# Patient Record
Sex: FEMALE | Race: BLACK OR AFRICAN AMERICAN | Employment: FULL TIME | URBAN - METROPOLITAN AREA
[De-identification: names, ages, dates, MRNs, and addresses within clinical notes are randomized per-mention and may not be internally consistent; named-entity substitution may affect disease eponyms.]

---

## 2017-09-20 ENCOUNTER — HOSPITAL ENCOUNTER (OUTPATIENT)
Dept: MAMMOGRAPHY | Age: 52
Discharge: HOME | End: 2017-09-20
Attending: ADVANCED PRACTICE MIDWIFE | Admitting: ADVANCED PRACTICE MIDWIFE

## 2017-09-20 DIAGNOSIS — Z12.31 ENCOUNTER FOR SCREENING MAMMOGRAM FOR BREAST CANCER: ICD-10-CM

## 2017-11-09 ENCOUNTER — OFFICE VISIT (OUTPATIENT)
Dept: INTERNAL MEDICINE CLINIC | Age: 52
End: 2017-11-09

## 2017-11-09 VITALS
OXYGEN SATURATION: 99 % | HEART RATE: 69 BPM | BODY MASS INDEX: 30.65 KG/M2 | SYSTOLIC BLOOD PRESSURE: 112 MMHG | WEIGHT: 173 LBS | DIASTOLIC BLOOD PRESSURE: 72 MMHG

## 2017-11-09 DIAGNOSIS — R51.9 NONINTRACTABLE HEADACHE, UNSPECIFIED CHRONICITY PATTERN, UNSPECIFIED HEADACHE TYPE: Primary | ICD-10-CM

## 2017-11-09 DIAGNOSIS — Z12.11 SCREEN FOR COLON CANCER: ICD-10-CM

## 2017-11-09 DIAGNOSIS — Z23 NEED FOR PROPHYLACTIC VACCINATION AND INOCULATION AGAINST INFLUENZA: ICD-10-CM

## 2017-11-09 PROCEDURE — 99213 OFFICE O/P EST LOW 20 MIN: CPT | Performed by: INTERNAL MEDICINE

## 2017-11-09 PROCEDURE — G8417 CALC BMI ABV UP PARAM F/U: HCPCS | Performed by: INTERNAL MEDICINE

## 2017-11-09 PROCEDURE — 1036F TOBACCO NON-USER: CPT | Performed by: INTERNAL MEDICINE

## 2017-11-09 PROCEDURE — G8484 FLU IMMUNIZE NO ADMIN: HCPCS | Performed by: INTERNAL MEDICINE

## 2017-11-09 PROCEDURE — G8427 DOCREV CUR MEDS BY ELIG CLIN: HCPCS | Performed by: INTERNAL MEDICINE

## 2017-11-09 PROCEDURE — 3014F SCREEN MAMMO DOC REV: CPT | Performed by: INTERNAL MEDICINE

## 2017-11-09 PROCEDURE — 90688 IIV4 VACCINE SPLT 0.5 ML IM: CPT | Performed by: INTERNAL MEDICINE

## 2017-11-09 PROCEDURE — 3017F COLORECTAL CA SCREEN DOC REV: CPT | Performed by: INTERNAL MEDICINE

## 2017-11-09 PROCEDURE — 90471 IMMUNIZATION ADMIN: CPT | Performed by: INTERNAL MEDICINE

## 2017-11-09 RX ORDER — DORZOLAMIDE HYDROCHLORIDE AND TIMOLOL MALEATE 20; 5 MG/ML; MG/ML
SOLUTION/ DROPS OPHTHALMIC
COMMUNITY
Start: 2017-11-07

## 2017-11-09 ASSESSMENT — ENCOUNTER SYMPTOMS
BACK PAIN: 0
CONSTIPATION: 0
WHEEZING: 0
VOMITING: 0
NAUSEA: 0
COUGH: 0
DIARRHEA: 0
CHEST TIGHTNESS: 0
ABDOMINAL DISTENTION: 0
SHORTNESS OF BREATH: 0

## 2017-11-09 ASSESSMENT — PATIENT HEALTH QUESTIONNAIRE - PHQ9
SUM OF ALL RESPONSES TO PHQ QUESTIONS 1-9: 0
SUM OF ALL RESPONSES TO PHQ9 QUESTIONS 1 & 2: 0
1. LITTLE INTEREST OR PLEASURE IN DOING THINGS: 0
2. FEELING DOWN, DEPRESSED OR HOPELESS: 0

## 2017-11-09 NOTE — PROGRESS NOTES
colon cancer  -     Amb External Referral To Gastroenterology    Need for prophylactic vaccination and inoculation against influenza  -     INFLUENZA, QUADV, 3 YRS AND OLDER, IM, MDV, 0.5ML (Ciro Munoz)          Plan:      See above plan

## 2018-11-21 ENCOUNTER — HOSPITAL ENCOUNTER (OUTPATIENT)
Dept: WOMENS IMAGING | Age: 53
Discharge: HOME | End: 2018-11-21
Payer: MEDICAID

## 2018-11-21 DIAGNOSIS — Z12.31 ENCOUNTER FOR SCREENING MAMMOGRAM FOR BREAST CANCER: ICD-10-CM

## 2018-11-21 PROCEDURE — 77063 BREAST TOMOSYNTHESIS BI: CPT

## 2019-04-15 ENCOUNTER — PATIENT MESSAGE (OUTPATIENT)
Dept: OTHER | Facility: CLINIC | Age: 54
End: 2019-04-15

## 2019-07-18 ENCOUNTER — OFFICE VISIT (OUTPATIENT)
Dept: INTERNAL MEDICINE CLINIC | Age: 54
End: 2019-07-18
Payer: MEDICAID

## 2019-07-18 VITALS
WEIGHT: 158.8 LBS | BODY MASS INDEX: 28.14 KG/M2 | SYSTOLIC BLOOD PRESSURE: 112 MMHG | OXYGEN SATURATION: 98 % | RESPIRATION RATE: 16 BRPM | HEART RATE: 75 BPM | HEIGHT: 63 IN | DIASTOLIC BLOOD PRESSURE: 74 MMHG

## 2019-07-18 DIAGNOSIS — R42 VERTIGO: Primary | ICD-10-CM

## 2019-07-18 PROCEDURE — G8427 DOCREV CUR MEDS BY ELIG CLIN: HCPCS | Performed by: INTERNAL MEDICINE

## 2019-07-18 PROCEDURE — 1036F TOBACCO NON-USER: CPT | Performed by: INTERNAL MEDICINE

## 2019-07-18 PROCEDURE — 3017F COLORECTAL CA SCREEN DOC REV: CPT | Performed by: INTERNAL MEDICINE

## 2019-07-18 PROCEDURE — 99213 OFFICE O/P EST LOW 20 MIN: CPT | Performed by: INTERNAL MEDICINE

## 2019-07-18 PROCEDURE — G8419 CALC BMI OUT NRM PARAM NOF/U: HCPCS | Performed by: INTERNAL MEDICINE

## 2019-07-18 ASSESSMENT — ENCOUNTER SYMPTOMS
NAUSEA: 0
CONSTIPATION: 0
DIARRHEA: 0
SINUS PAIN: 0
WHEEZING: 0
SINUS PRESSURE: 0
BACK PAIN: 0
RHINORRHEA: 0
COUGH: 0
ABDOMINAL DISTENTION: 0
CHEST TIGHTNESS: 0
SHORTNESS OF BREATH: 0
VOMITING: 0

## 2019-07-18 ASSESSMENT — PATIENT HEALTH QUESTIONNAIRE - PHQ9
SUM OF ALL RESPONSES TO PHQ9 QUESTIONS 1 & 2: 0
2. FEELING DOWN, DEPRESSED OR HOPELESS: 0
SUM OF ALL RESPONSES TO PHQ QUESTIONS 1-9: 0
1. LITTLE INTEREST OR PLEASURE IN DOING THINGS: 0
SUM OF ALL RESPONSES TO PHQ QUESTIONS 1-9: 0

## 2019-07-18 NOTE — PROGRESS NOTES
7/18/19    César Llanos  1965      Chief Complaint   Patient presents with    Dizziness       HPI     Here with 2 week h/o dizziness. Began after a respiratory infection which has resolved. States it feels like the room is spinning. Exacerbated with rotating her head or rolling over in bed. sxs last approx 30 seconds. Denies diplopia, dysarthria, dysphagia. Review of Systems   Constitutional: Negative for unexpected weight change. HENT: Negative for congestion, ear discharge, ear pain, postnasal drip, rhinorrhea, sinus pressure and sinus pain. Respiratory: Negative for cough, chest tightness, shortness of breath and wheezing. Cardiovascular: Negative for chest pain, palpitations and leg swelling. Gastrointestinal: Negative for abdominal distention, constipation, diarrhea, nausea and vomiting. Musculoskeletal: Negative for arthralgias, back pain and myalgias. Neurological: Positive for dizziness. Negative for tremors and numbness. All other systems reviewed and are negative. Current Outpatient Medications   Medication Sig Dispense Refill    dorzolamide-timolol (COSOPT) 22.3-6.8 MG/ML ophthalmic solution       latanoprost (XALATAN) 0.005 % ophthalmic solution       butalbital-acetaminophen-caffeine (FIORICET, ESGIC) -40 MG per tablet Take 1 tablet by mouth every 6 hours as needed for Headaches 20 tablet 0     No current facility-administered medications for this visit. Physical Exam   Constitutional: She is oriented to person, place, and time. She appears well-developed and well-nourished. No distress. HENT:   Right Ear: External ear normal.   Left Ear: External ear normal.   Mouth/Throat: Oropharynx is clear and moist. No oropharyngeal exudate. B tms wnl   Neck: Neck supple. No thyromegaly present. Cardiovascular: Normal rate, regular rhythm and normal heart sounds. Exam reveals no gallop and no friction rub. No murmur heard.   Pulmonary/Chest: Effort

## 2019-10-25 ENCOUNTER — E-VISIT (OUTPATIENT)
Dept: INTERNAL MEDICINE CLINIC | Age: 54
End: 2019-10-25
Payer: MEDICAID

## 2019-10-25 DIAGNOSIS — R09.81 SINUS CONGESTION: Primary | ICD-10-CM

## 2019-10-25 PROCEDURE — 98969 PR NONPHYSICIAN ONLINE ASSESSMENT AND MANAGEMENT: CPT | Performed by: NURSE PRACTITIONER

## 2019-10-25 ASSESSMENT — LIFESTYLE VARIABLES: SMOKING_STATUS: NO, I'VE NEVER SMOKED

## 2019-10-26 RX ORDER — CETIRIZINE HYDROCHLORIDE 10 MG/1
10 TABLET ORAL DAILY
Qty: 30 TABLET | Refills: 0 | Status: SHIPPED | OUTPATIENT
Start: 2019-10-26 | End: 2020-02-06

## 2019-10-26 RX ORDER — DOXYCYCLINE 100 MG/1
100 CAPSULE ORAL 2 TIMES DAILY
Qty: 20 CAPSULE | Refills: 0 | Status: SHIPPED | OUTPATIENT
Start: 2019-10-26

## 2020-02-06 RX ORDER — CETIRIZINE HYDROCHLORIDE 10 MG/1
TABLET ORAL
Qty: 30 TABLET | Refills: 0 | Status: SHIPPED | OUTPATIENT
Start: 2020-02-06

## 2020-12-31 ENCOUNTER — OFFICE VISIT (OUTPATIENT)
Dept: PRIMARY CARE CLINIC | Age: 55
End: 2020-12-31
Payer: MEDICAID

## 2020-12-31 PROCEDURE — G8421 BMI NOT CALCULATED: HCPCS | Performed by: NURSE PRACTITIONER

## 2020-12-31 PROCEDURE — 99211 OFF/OP EST MAY X REQ PHY/QHP: CPT | Performed by: NURSE PRACTITIONER

## 2020-12-31 PROCEDURE — G8428 CUR MEDS NOT DOCUMENT: HCPCS | Performed by: NURSE PRACTITIONER

## 2020-12-31 NOTE — PATIENT INSTRUCTIONS
Advance Care Planning  People with COVID-19 may have no symptoms, mild symptoms, such as fever, cough, and shortness of breath or they may have more severe illness, developing severe and fatal pneumonia. As a result, Advance Care Planning with attention to naming a health care decision maker (someone you trust to make healthcare decisions for you if you could not speak for yourself) and sharing other health care preferences is important BEFORE a possible health crisis. Please contact your Primary Care Provider to discuss Advance Care Planning. Preventing the Spread of Coronavirus Disease 2019 in Homes and Residential Communities  For the most recent information go to [email or web address]    Prevention steps for People with confirmed or suspected COVID-19 (including persons under investigation) who do not need to be hospitalized  and   People with confirmed COVID-19 who were hospitalized and determined to be medically stable to go home    Your healthcare provider and public health staff will evaluate whether you can be cared for at home. If it is determined that you do not need to be hospitalized and can be isolated at home, you will be monitored by staff from your local or state health department. You should follow the prevention steps below until a healthcare provider or local or state health department says you can return to your normal activities. Stay home except to get medical care  People who are mildly ill with COVID-19 are able to isolate at home during their illness. You should restrict activities outside your home, except for getting medical care. Do not go to work, school, or public areas. Avoid using public transportation, ride-sharing, or taxis.   Separate yourself from other people and animals in your home People: As much as possible, you should stay in a specific room and away from other people in your home. Also, you should use a separate bathroom, if available. Animals: You should restrict contact with pets and other animals while you are sick with COVID-19, just like you would around other people. Although there have not been reports of pets or other animals becoming sick with COVID-19, it is still recommended that people sick with COVID-19 limit contact with animals until more information is known about the virus. When possible, have another member of your household care for your animals while you are sick. If you are sick with COVID-19, avoid contact with your pet, including petting, snuggling, being kissed or licked, and sharing food. If you must care for your pet or be around animals while you are sick, wash your hands before and after you interact with pets and wear a facemask. Call ahead before visiting your doctor  If you have a medical appointment, call the healthcare provider and tell them that you have or may have COVID-19. This will help the healthcare providers office take steps to keep other people from getting infected or exposed. Wear a facemask  You should wear a facemask when you are around other people (e.g., sharing a room or vehicle) or pets and before you enter a healthcare providers office. If you are not able to wear a facemask (for example, because it causes trouble breathing), then people who live with you should not stay in the same room with you, or they should wear a facemask if they enter your room. Cover your coughs and sneezes  Cover your mouth and nose with a tissue when you cough or sneeze. Throw used tissues in a lined trash can. Immediately wash your hands with soap and water for at least 20 seconds or, if soap and water are not available, clean your hands with an alcohol-based hand  that contains at least 60% alcohol.   Clean your hands often Wash your hands often with soap and water for at least 20 seconds, especially after blowing your nose, coughing, or sneezing; going to the bathroom; and before eating or preparing food. If soap and water are not readily available, use an alcohol-based hand  with at least 60% alcohol, covering all surfaces of your hands and rubbing them together until they feel dry. Soap and water are the best option if hands are visibly dirty. Avoid touching your eyes, nose, and mouth with unwashed hands. Avoid sharing personal household items  You should not share dishes, drinking glasses, cups, eating utensils, towels, or bedding with other people or pets in your home. After using these items, they should be washed thoroughly with soap and water. Clean all high-touch surfaces everyday  High touch surfaces include counters, tabletops, doorknobs, bathroom fixtures, toilets, phones, keyboards, tablets, and bedside tables. Also, clean any surfaces that may have blood, stool, or body fluids on them. Use a household cleaning spray or wipe, according to the label instructions. Labels contain instructions for safe and effective use of the cleaning product including precautions you should take when applying the product, such as wearing gloves and making sure you have good ventilation during use of the product.   Monitor your symptoms

## 2020-12-31 NOTE — PROGRESS NOTES
Carmen Francine received a viral test for COVID-19. They were educated on isolation and quarantine as appropriate. For any symptoms, they were directed to seek care from their PCP, given contact information to establish with a doctor, directed to an urgent care or the emergency room.

## 2021-01-02 LAB — SARS-COV-2, NAA: NOT DETECTED

## 2021-08-19 ENCOUNTER — OFFICE VISIT (OUTPATIENT)
Dept: INTERNAL MEDICINE CLINIC | Age: 56
End: 2021-08-19
Payer: MEDICAID

## 2021-08-19 ENCOUNTER — HOSPITAL ENCOUNTER (OUTPATIENT)
Age: 56
Discharge: HOME | End: 2021-08-19
Payer: MEDICAID

## 2021-08-19 VITALS
WEIGHT: 175 LBS | HEART RATE: 62 BPM | HEIGHT: 64 IN | DIASTOLIC BLOOD PRESSURE: 72 MMHG | SYSTOLIC BLOOD PRESSURE: 112 MMHG | BODY MASS INDEX: 29.88 KG/M2 | OXYGEN SATURATION: 99 %

## 2021-08-19 DIAGNOSIS — Z00.00 WELL ADULT EXAM: ICD-10-CM

## 2021-08-19 DIAGNOSIS — G89.29 CHRONIC PAIN OF BOTH KNEES: ICD-10-CM

## 2021-08-19 DIAGNOSIS — Z00.00 WELL ADULT EXAM: Primary | ICD-10-CM

## 2021-08-19 DIAGNOSIS — M25.562 CHRONIC PAIN OF BOTH KNEES: ICD-10-CM

## 2021-08-19 DIAGNOSIS — M25.561 CHRONIC PAIN OF BOTH KNEES: ICD-10-CM

## 2021-08-19 DIAGNOSIS — Z13.1 SCREENING FOR DIABETES MELLITUS: ICD-10-CM

## 2021-08-19 DIAGNOSIS — Z12.31 ENCOUNTER FOR SCREENING MAMMOGRAM FOR MALIGNANT NEOPLASM OF BREAST: ICD-10-CM

## 2021-08-19 LAB
A/G RATIO: 1.5 (ref 1.1–2.2)
ALBUMIN SERPL-MCNC: 4.4 G/DL (ref 3.4–5)
ALP BLD-CCNC: 79 U/L (ref 40–129)
ALT SERPL-CCNC: 12 U/L (ref 10–40)
ANION GAP SERPL CALCULATED.3IONS-SCNC: 9 MMOL/L (ref 3–16)
AST SERPL-CCNC: 13 U/L (ref 15–37)
BILIRUB SERPL-MCNC: <0.2 MG/DL (ref 0–1)
BUN BLDV-MCNC: 10 MG/DL (ref 7–20)
CALCIUM SERPL-MCNC: 9.2 MG/DL (ref 8.3–10.6)
CHLORIDE BLD-SCNC: 108 MMOL/L (ref 99–110)
CHOLESTEROL, TOTAL: 215 MG/DL (ref 0–199)
CO2: 27 MMOL/L (ref 21–32)
CREAT SERPL-MCNC: 0.6 MG/DL (ref 0.6–1.1)
GFR AFRICAN AMERICAN: >60
GFR NON-AFRICAN AMERICAN: >60
GLOBULIN: 3 G/DL
GLUCOSE BLD-MCNC: 72 MG/DL (ref 70–99)
HDLC SERPL-MCNC: 63 MG/DL (ref 40–60)
LDL CHOLESTEROL CALCULATED: 132 MG/DL
POTASSIUM SERPL-SCNC: 5 MMOL/L (ref 3.5–5.1)
SODIUM BLD-SCNC: 144 MMOL/L (ref 136–145)
TOTAL PROTEIN: 7.4 G/DL (ref 6.4–8.2)
TRIGL SERPL-MCNC: 102 MG/DL (ref 0–150)
VLDLC SERPL CALC-MCNC: 20 MG/DL

## 2021-08-19 PROCEDURE — 36415 COLL VENOUS BLD VENIPUNCTURE: CPT

## 2021-08-19 PROCEDURE — 80053 COMPREHEN METABOLIC PANEL: CPT

## 2021-08-19 PROCEDURE — 83036 HEMOGLOBIN GLYCOSYLATED A1C: CPT

## 2021-08-19 PROCEDURE — 99396 PREV VISIT EST AGE 40-64: CPT | Performed by: INTERNAL MEDICINE

## 2021-08-19 PROCEDURE — 80061 LIPID PANEL: CPT

## 2021-08-19 RX ORDER — PILOCARPINE HYDROCHLORIDE 10 MG/ML
SOLUTION/ DROPS OPHTHALMIC
COMMUNITY
Start: 2021-08-10

## 2021-08-19 SDOH — ECONOMIC STABILITY: FOOD INSECURITY: WITHIN THE PAST 12 MONTHS, THE FOOD YOU BOUGHT JUST DIDN'T LAST AND YOU DIDN'T HAVE MONEY TO GET MORE.: NEVER TRUE

## 2021-08-19 SDOH — ECONOMIC STABILITY: FOOD INSECURITY: WITHIN THE PAST 12 MONTHS, YOU WORRIED THAT YOUR FOOD WOULD RUN OUT BEFORE YOU GOT MONEY TO BUY MORE.: NEVER TRUE

## 2021-08-19 ASSESSMENT — ENCOUNTER SYMPTOMS
NAUSEA: 0
DIARRHEA: 0
SHORTNESS OF BREATH: 0
CONSTIPATION: 0
ABDOMINAL DISTENTION: 0
WHEEZING: 0
COUGH: 0
BACK PAIN: 0
VOMITING: 0
CHEST TIGHTNESS: 0

## 2021-08-19 ASSESSMENT — PATIENT HEALTH QUESTIONNAIRE - PHQ9
SUM OF ALL RESPONSES TO PHQ QUESTIONS 1-9: 0
2. FEELING DOWN, DEPRESSED OR HOPELESS: 0
SUM OF ALL RESPONSES TO PHQ QUESTIONS 1-9: 0
SUM OF ALL RESPONSES TO PHQ QUESTIONS 1-9: 0
1. LITTLE INTEREST OR PLEASURE IN DOING THINGS: 0
SUM OF ALL RESPONSES TO PHQ9 QUESTIONS 1 & 2: 0

## 2021-08-19 ASSESSMENT — SOCIAL DETERMINANTS OF HEALTH (SDOH): HOW HARD IS IT FOR YOU TO PAY FOR THE VERY BASICS LIKE FOOD, HOUSING, MEDICAL CARE, AND HEATING?: NOT HARD AT ALL

## 2021-08-19 NOTE — PROGRESS NOTES
8/19/21    Roselyn Tamayo  1965      Chief Complaint   Patient presents with    Annual Exam    Other     Left knee issues         /PLAN:  1. Well adult exam  Encouraged exercise and healthy diet. F/u with ob/gyn and dentist regularly. Recommend eye exam.  Wears seat belt. Provided rx for fasting labs. Continue medications. - Lipid Panel; Future  - Comprehensive Metabolic Panel; Future    2. Screening for diabetes mellitus  - Hemoglobin A1C; Future    3. Chronic pain of both knees  - [address] [facility]    4. Encounter for screening mammogram for malignant neoplasm of breast  - [facility] DIGITAL SCREEN W OR WO CAD BILATERAL; Future      HPI  Past Medical History, Medications, Social History, Family History, Health Maintenance have been reviewed with Roselyn Tamayo. C/o several month h/o B knee pain and feels as if they will give out on her when walking down steps. Left> right. No injury, swelling, redness      Review of Systems   Constitutional: Negative for unexpected weight change. Respiratory: Negative for cough, chest tightness, shortness of breath and wheezing. Cardiovascular: Negative for chest pain, palpitations and leg swelling. Gastrointestinal: Negative for abdominal distention, constipation, diarrhea, nausea and vomiting. Musculoskeletal: Positive for arthralgias. Negative for back pain and myalgias. Neurological: Negative for tremors and numbness. All other systems reviewed and are negative.       Current Outpatient Medications   Medication Sig Dispense Refill    cetirizine (ZYRTEC) 10 MG tablet TAKE ONE TABLET BY MOUTH DAILY 30 tablet 0    doxycycline monohydrate (MONODOX) 100 MG capsule Take 1 capsule by mouth 2 times daily 20 capsule 0    dorzolamide-timolol (COSOPT) 22.3-6.8 MG/ML ophthalmic solution       latanoprost (XALATAN) 0.005 % ophthalmic solution       pilocarpine (PILOCAR) 1 % ophthalmic solution       butalbital-acetaminophen-caffeine (FIORICET, ESGIC) -40 MG per tablet Take 1 tablet by mouth every 6 hours as needed for Headaches 20 tablet 0     No current facility-administered medications for this visit. Physical Exam  Constitutional:       General: She is not in acute distress. Appearance: She is well-developed. She is not diaphoretic. HENT:      Right Ear: External ear normal.      Left Ear: External ear normal.   Neck:      Thyroid: No thyromegaly. Cardiovascular:      Rate and Rhythm: Normal rate and regular rhythm. Heart sounds: Normal heart sounds. No murmur heard. No friction rub. No gallop. Pulmonary:      Effort: Pulmonary effort is normal. No respiratory distress. Breath sounds: Normal breath sounds. No wheezing or rales. Abdominal:      General: There is no distension. Palpations: Abdomen is soft. Tenderness: There is no abdominal tenderness. There is no guarding or rebound. Musculoskeletal:         General: No swelling, tenderness, deformity or signs of injury. Normal range of motion. Cervical back: Neck supple. Right lower leg: No edema. Left lower leg: No edema. Neurological:      Mental Status: She is alert and oriented to person, place, and time. Psychiatric:         Mood and Affect: Mood normal.         Behavior: Behavior normal.         Thought Content:  Thought content normal.         Judgment: Judgment normal.         Vitals:    08/19/21 0847   BP: 112/72   Pulse: 62   SpO2: 99%

## 2021-08-20 LAB
ESTIMATED AVERAGE GLUCOSE: 114 MG/DL
HBA1C MFR BLD: 5.6 %

## 2021-10-27 ENCOUNTER — HOSPITAL ENCOUNTER (OUTPATIENT)
Dept: WOMENS IMAGING | Age: 56
Discharge: HOME | End: 2021-10-27
Payer: MEDICAID

## 2021-10-27 VITALS — HEIGHT: 64 IN | BODY MASS INDEX: 29.02 KG/M2 | WEIGHT: 170 LBS

## 2021-10-27 DIAGNOSIS — Z12.31 ENCOUNTER FOR SCREENING MAMMOGRAM FOR MALIGNANT NEOPLASM OF BREAST: ICD-10-CM

## 2021-10-27 PROCEDURE — 77067 SCR MAMMO BI INCL CAD: CPT

## 2021-10-27 PROCEDURE — 77063 BREAST TOMOSYNTHESIS BI: CPT

## 2022-10-11 ENCOUNTER — TELEPHONE (OUTPATIENT)
Dept: INTERNAL MEDICINE CLINIC | Age: 57
End: 2022-10-11

## 2022-10-11 NOTE — TELEPHONE ENCOUNTER
----- Message from Francis Hill sent at 10/11/2022 10:48 AM EDT -----  Subject: Appointment Request    Reason for Call: New Patient/New to Provider Appointment needed: New   Patient Request Appointment    QUESTIONS    Reason for appointment request? No appointments available during search     Additional Information for Provider? pt is willing to wait for open luis  ---------------------------------------------------------------------------  --------------  [address]  [phone]; OK to leave message on voicemail  ---------------------------------------------------------------------------  --------------  SCRIPT ANSWERS  COVID Screen: [facility]

## 2022-10-11 NOTE — TELEPHONE ENCOUNTER
Left voice mail for patient to call office to schedule a New to Provider appointment.   Previous patient of Dr. Jacob Grossman

## 2022-10-12 NOTE — TELEPHONE ENCOUNTER
I spoke with patient. She wants to think about it and do some research before choosing a provider to schedule with. She said she would call us back today.

## 2022-12-22 ENCOUNTER — HOSPITAL ENCOUNTER (OUTPATIENT)
Dept: WOMENS IMAGING | Age: 57
Discharge: HOME | End: 2022-12-22
Payer: MEDICAID

## 2022-12-22 DIAGNOSIS — Z12.31 ENCOUNTER FOR SCREENING MAMMOGRAM FOR BREAST CANCER: ICD-10-CM

## 2022-12-22 PROCEDURE — 77063 BREAST TOMOSYNTHESIS BI: CPT

## 2023-01-12 ENCOUNTER — OFFICE VISIT (OUTPATIENT)
Dept: FAMILY MEDICINE CLINIC | Age: 58
End: 2023-01-12

## 2023-01-12 VITALS
WEIGHT: 178.8 LBS | BODY MASS INDEX: 31.68 KG/M2 | SYSTOLIC BLOOD PRESSURE: 112 MMHG | HEIGHT: 63 IN | HEART RATE: 85 BPM | OXYGEN SATURATION: 98 % | DIASTOLIC BLOOD PRESSURE: 70 MMHG

## 2023-01-12 DIAGNOSIS — Z86.13 HISTORY OF MALARIA: ICD-10-CM

## 2023-01-12 DIAGNOSIS — Z76.89 ENCOUNTER TO ESTABLISH CARE: Primary | ICD-10-CM

## 2023-01-12 DIAGNOSIS — Z76.89 ENCOUNTER TO ESTABLISH CARE: ICD-10-CM

## 2023-01-12 DIAGNOSIS — H40.1130 PRIMARY OPEN ANGLE GLAUCOMA OF BOTH EYES, UNSPECIFIED GLAUCOMA STAGE: ICD-10-CM

## 2023-01-12 DIAGNOSIS — H81.10 BENIGN PAROXYSMAL POSITIONAL VERTIGO, UNSPECIFIED LATERALITY: ICD-10-CM

## 2023-01-12 LAB
A/G RATIO: 1.5 (ref 1.1–2.2)
ALBUMIN SERPL-MCNC: 4.5 G/DL (ref 3.4–5)
ALP BLD-CCNC: 79 U/L (ref 40–129)
ALT SERPL-CCNC: 10 U/L (ref 10–40)
ANION GAP SERPL CALCULATED.3IONS-SCNC: 11 MMOL/L (ref 3–16)
AST SERPL-CCNC: 13 U/L (ref 15–37)
BILIRUB SERPL-MCNC: <0.2 MG/DL (ref 0–1)
BUN BLDV-MCNC: 14 MG/DL (ref 7–20)
CALCIUM SERPL-MCNC: 10.1 MG/DL (ref 8.3–10.6)
CHLORIDE BLD-SCNC: 105 MMOL/L (ref 99–110)
CHOLESTEROL, FASTING: 265 MG/DL (ref 0–199)
CO2: 26 MMOL/L (ref 21–32)
CREAT SERPL-MCNC: 0.8 MG/DL (ref 0.6–1.1)
GFR SERPL CREATININE-BSD FRML MDRD: >60 ML/MIN/{1.73_M2}
GLUCOSE BLD-MCNC: 88 MG/DL (ref 70–99)
HCT VFR BLD CALC: 44.1 % (ref 36–48)
HDLC SERPL-MCNC: 64 MG/DL (ref 40–60)
HEMOGLOBIN: 14.1 G/DL (ref 12–16)
LDL CHOLESTEROL CALCULATED: 176 MG/DL
MCH RBC QN AUTO: 27.2 PG (ref 26–34)
MCHC RBC AUTO-ENTMCNC: 32.1 G/DL (ref 31–36)
MCV RBC AUTO: 85 FL (ref 80–100)
PDW BLD-RTO: 14.4 % (ref 12.4–15.4)
PLATELET # BLD: 217 K/UL (ref 135–450)
PMV BLD AUTO: 8.1 FL (ref 5–10.5)
POTASSIUM SERPL-SCNC: 4.4 MMOL/L (ref 3.5–5.1)
RBC # BLD: 5.19 M/UL (ref 4–5.2)
SODIUM BLD-SCNC: 142 MMOL/L (ref 136–145)
TOTAL PROTEIN: 7.6 G/DL (ref 6.4–8.2)
TRIGLYCERIDE, FASTING: 124 MG/DL (ref 0–150)
TSH REFLEX: 2.82 UIU/ML (ref 0.27–4.2)
VLDLC SERPL CALC-MCNC: 25 MG/DL
WBC # BLD: 3.8 K/UL (ref 4–11)

## 2023-01-12 RX ORDER — LATANOPROST 50 UG/ML
1 SOLUTION/ DROPS OPHTHALMIC
COMMUNITY
Start: 2021-07-02

## 2023-01-12 RX ORDER — PILOCARPINE HYDROCHLORIDE 10 MG/ML
1 SOLUTION/ DROPS OPHTHALMIC
COMMUNITY
Start: 2021-05-07

## 2023-01-12 RX ORDER — TIMOLOL MALEATE 5 MG/ML
1 SOLUTION/ DROPS OPHTHALMIC
COMMUNITY
Start: 2021-07-14 | End: 2023-01-12

## 2023-01-12 RX ORDER — MECLIZINE HCL 12.5 MG/1
12.5 TABLET ORAL 3 TIMES DAILY PRN
Qty: 15 TABLET | Refills: 0 | Status: SHIPPED | OUTPATIENT
Start: 2023-01-12 | End: 2023-01-22

## 2023-01-12 SDOH — ECONOMIC STABILITY: TRANSPORTATION INSECURITY
IN THE PAST 12 MONTHS, HAS THE LACK OF TRANSPORTATION KEPT YOU FROM MEDICAL APPOINTMENTS OR FROM GETTING MEDICATIONS?: NO

## 2023-01-12 SDOH — ECONOMIC STABILITY: TRANSPORTATION INSECURITY
IN THE PAST 12 MONTHS, HAS LACK OF TRANSPORTATION KEPT YOU FROM MEETINGS, WORK, OR FROM GETTING THINGS NEEDED FOR DAILY LIVING?: NO

## 2023-01-12 SDOH — ECONOMIC STABILITY: FOOD INSECURITY: WITHIN THE PAST 12 MONTHS, THE FOOD YOU BOUGHT JUST DIDN'T LAST AND YOU DIDN'T HAVE MONEY TO GET MORE.: NEVER TRUE

## 2023-01-12 SDOH — ECONOMIC STABILITY: INCOME INSECURITY: IN THE LAST 12 MONTHS, WAS THERE A TIME WHEN YOU WERE NOT ABLE TO PAY THE MORTGAGE OR RENT ON TIME?: NO

## 2023-01-12 SDOH — ECONOMIC STABILITY: HOUSING INSECURITY
IN THE LAST 12 MONTHS, WAS THERE A TIME WHEN YOU DID NOT HAVE A STEADY PLACE TO SLEEP OR SLEPT IN A SHELTER (INCLUDING NOW)?: NO

## 2023-01-12 SDOH — ECONOMIC STABILITY: HOUSING INSECURITY: IN THE LAST 12 MONTHS, HOW MANY PLACES HAVE YOU LIVED?: 1

## 2023-01-12 SDOH — ECONOMIC STABILITY: FOOD INSECURITY: WITHIN THE PAST 12 MONTHS, YOU WORRIED THAT YOUR FOOD WOULD RUN OUT BEFORE YOU GOT MONEY TO BUY MORE.: NEVER TRUE

## 2023-01-12 ASSESSMENT — SOCIAL DETERMINANTS OF HEALTH (SDOH): HOW HARD IS IT FOR YOU TO PAY FOR THE VERY BASICS LIKE FOOD, HOUSING, MEDICAL CARE, AND HEATING?: NOT HARD AT ALL

## 2023-01-12 ASSESSMENT — PATIENT HEALTH QUESTIONNAIRE - PHQ9
2. FEELING DOWN, DEPRESSED OR HOPELESS: 1
SUM OF ALL RESPONSES TO PHQ QUESTIONS 1-9: 1
1. LITTLE INTEREST OR PLEASURE IN DOING THINGS: 0
SUM OF ALL RESPONSES TO PHQ QUESTIONS 1-9: 1
SUM OF ALL RESPONSES TO PHQ9 QUESTIONS 1 & 2: 1

## 2023-01-12 NOTE — PROGRESS NOTES
Julian Puente  YOB: 1965    Date of Service:  1/12/2023    Chief Complaint:   Julian Puente is a 62 y.o. female who presents to [facility]     Last doctor moved    Allergies: Allergies   Allergen Reactions    Cat Hair Extract     Chloroquine Itching       Family Hx:  Family History   Problem Relation Age of Onset    Hypertension Mother     Hypertension Father        Surgical HX:  Past Surgical History:   Procedure Laterality Date    MOUTH SURGERY         Social Hx:  Alcohol-  occasionally  Tobacco- never  Recreational- never  Patient's last menstrual period was 07/17/2019. menstrual cycle: post menopausal  Sexual activity: has sex with a male, no hx STD  AbnormalSxs: no      Medications:  Current Outpatient Medications   Medication Sig Dispense Refill    pilocarpine (PILOCAR) 1 % ophthalmic solution Apply 1 drop to eye      latanoprost (XALATAN) 0.005 % ophthalmic solution Apply 1 drop to eye      meclizine (ANTIVERT) 12.5 MG tablet Take 1 tablet by mouth 3 times daily as needed for Dizziness 15 tablet 0    cetirizine (ZYRTEC) 10 MG tablet TAKE ONE TABLET BY MOUTH DAILY 30 tablet 0    dorzolamide-timolol (COSOPT) 22.3-6.8 MG/ML ophthalmic solution        No current facility-administered medications for this visit. PMHx:  Patient Active Problem List   Diagnosis    Primary open angle glaucoma    History of malaria           HPI:      From [place] - moved here in 2000      Dizziness  - feels like vertigo  - had it before 3 years - did some exercises that made it go away  -yesterday morning, when she got out of bed  - lasted for a couple seconds  - happens when she makes a sudden movement or when she puts her eye drops in.          Wt Readings from Last 3 Encounters:   01/12/23 178 lb 12.8 oz (81.1 kg)   10/27/21 170 lb (77.1 kg)   08/19/21 175 lb (79.4 kg)     BP Readings from Last 3 Encounters:   01/12/23 112/70   08/19/21 112/72   07/18/19 112/74       Health Maintenance   Topic Date Due HIV screen  Never done    Hepatitis C screen  Never done    Cervical cancer screen  Never done    Shingles vaccine (1 of 2) Never done    COVID-19 Vaccine (3 - Booster for Pfizer series) 07/17/2021    Flu vaccine (1) 08/01/2022    Depression Screen  08/19/2022    Breast cancer screen  12/22/2024    DTaP/Tdap/Td vaccine (2 - Td or Tdap) 05/11/2025    Lipids  08/19/2026    Colorectal Cancer Screen  08/26/2029    Hepatitis A vaccine  Aged Out    Hib vaccine  Aged Out    Meningococcal (ACWY) vaccine  Aged Out    Pneumococcal 0-64 years Vaccine  Aged [employer] History   Administered Date(s) Administered    COVID-19, PFIZER PURPLE top, DILUTE for use, (age 15 y+), 30mcg/0.3mL 05/01/2021, 05/22/2021    Influenza, AFLURIA (age 1 yrs+), FLUZONE, (age 10 mo+), MDV, 0.5mL 11/09/2017    Tdap (Boostrix, Adacel) 05/11/2015       No flowsheet data found. PHQ-9  1/12/2023   Little interest or pleasure in doing things 0   Feeling down, depressed, or hopeless 1   PHQ-2 Score 1   PHQ-9 Total Score 1        The 10-year ASCVD risk score (Tal WHIET, et al., 2019) is: 2.5%    Values used to calculate the score:      Age: 62 years      Sex: Female      Is Non- : Yes      Diabetic: No      Tobacco smoker: No      Systolic Blood Pressure: 754 mmHg      Is BP treated: No      HDL Cholesterol: 63 mg/dL      Total Cholesterol: 215 mg/dL        Review of Systems:  Review of Systems    Physical Exam:   Vitals:    01/12/23 0928   BP: 112/70   Site: Right Upper Arm   Position: Sitting   Cuff Size: Large Adult   Pulse: 85   SpO2: 98%   Weight: 178 lb 12.8 oz (81.1 kg)   Height: 5' 3\" (1.6 m)     Body mass index is 31.67 kg/m². Physical Exam  Constitutional:       Appearance: Normal appearance. HENT:      Head: Atraumatic.       Right Ear: External ear normal.      Left Ear: External ear normal.      Nose: Nose normal.      Mouth/Throat:      Mouth: Mucous membranes are moist.      Pharynx: Oropharynx is clear. No posterior oropharyngeal erythema. Eyes:      General: No scleral icterus. Extraocular Movements: Extraocular movements intact. Pupils: Pupils are equal, round, and reactive to light. Cardiovascular:      Rate and Rhythm: Normal rate and regular rhythm. Pulses: Normal pulses. Heart sounds: No murmur heard. Pulmonary:      Effort: Pulmonary effort is normal.      Breath sounds: Normal breath sounds. Abdominal:      General: Abdomen is flat. Musculoskeletal:         General: Normal range of motion. Cervical back: Normal range of motion and neck supple. Skin:     General: Skin is warm and dry. Capillary Refill: Capillary refill takes 2 to 3 seconds. Neurological:      General: No focal deficit present. Mental Status: She is alert. Comments: Positive [place] on left   Psychiatric:         Mood and Affect: Mood normal.       Assessment/Plan:    Patient presents today for new patient visit. Her main concern today is vertigo and will happen for a few seconds at a time since yesterday morning. Has a history of BPPV, based on physical exam today is consistent with what she has. Did send in some Antivert for her. Offered to send her to physical therapy for Epley maneuver, patient however preferred to watch a YouTube video at her home and do it herself. If no improvement I will send in that referral.    For new patient visit today we did get baseline labs. We will have her return in 6 months for her annual physical.    1. Encounter to establish care  -     TSH with Reflex; Future  -     Hemoglobin A1C; Future  -     Comprehensive Metabolic Panel; Future  -     Lipid, Fasting; Future  -     CBC; Future  2. History of malaria  3. Primary open angle glaucoma of both eyes, unspecified glaucoma stage  4. Benign paroxysmal positional vertigo, unspecified laterality  -     meclizine (ANTIVERT) 12.5 MG tablet;  Take 1 tablet by mouth 3 times daily as needed for Dizziness, Disp-15 tablet, R-0Normal     While assessing care for this patient, I have reviewed all pertinent lab work/imaging/ specialist notes and care in reference to those problems addressed above in detail. Appropriate medical decision making was based on this.     Please note that portions of this note may have been completed with a voice recognition program. Efforts were made to edit the dictations but occasionally words are mis-transcribed.      Return in about 6 months (around 7/12/2023) for Physical.    Leighann Jesus MD  1/12/2023

## 2023-01-13 LAB
ESTIMATED AVERAGE GLUCOSE: 114 MG/DL
HBA1C MFR BLD: 5.6 %

## 2023-02-01 ENCOUNTER — OFFICE VISIT (OUTPATIENT)
Dept: FAMILY MEDICINE CLINIC | Age: 58
End: 2023-02-01
Payer: MEDICAID

## 2023-02-01 VITALS
HEIGHT: 63 IN | OXYGEN SATURATION: 99 % | RESPIRATION RATE: 16 BRPM | DIASTOLIC BLOOD PRESSURE: 84 MMHG | WEIGHT: 183 LBS | BODY MASS INDEX: 32.43 KG/M2 | HEART RATE: 71 BPM | SYSTOLIC BLOOD PRESSURE: 116 MMHG

## 2023-02-01 DIAGNOSIS — M79.601 RIGHT ARM PAIN: ICD-10-CM

## 2023-02-01 DIAGNOSIS — H40.1130 PRIMARY OPEN ANGLE GLAUCOMA OF BOTH EYES, UNSPECIFIED GLAUCOMA STAGE: ICD-10-CM

## 2023-02-01 DIAGNOSIS — Z76.89 ENCOUNTER TO ESTABLISH CARE: ICD-10-CM

## 2023-02-01 DIAGNOSIS — H81.10 BENIGN PAROXYSMAL POSITIONAL VERTIGO, UNSPECIFIED LATERALITY: Primary | ICD-10-CM

## 2023-02-01 DIAGNOSIS — E78.00 HYPERCHOLESTEREMIA: ICD-10-CM

## 2023-02-01 PROCEDURE — G8427 DOCREV CUR MEDS BY ELIG CLIN: HCPCS | Performed by: REGISTERED NURSE

## 2023-02-01 PROCEDURE — 1036F TOBACCO NON-USER: CPT | Performed by: REGISTERED NURSE

## 2023-02-01 PROCEDURE — 99214 OFFICE O/P EST MOD 30 MIN: CPT | Performed by: REGISTERED NURSE

## 2023-02-01 PROCEDURE — G8484 FLU IMMUNIZE NO ADMIN: HCPCS | Performed by: REGISTERED NURSE

## 2023-02-01 PROCEDURE — 3017F COLORECTAL CA SCREEN DOC REV: CPT | Performed by: REGISTERED NURSE

## 2023-02-01 PROCEDURE — G8417 CALC BMI ABV UP PARAM F/U: HCPCS | Performed by: REGISTERED NURSE

## 2023-02-01 ASSESSMENT — ENCOUNTER SYMPTOMS
EYES NEGATIVE: 1
GASTROINTESTINAL NEGATIVE: 1
SHORTNESS OF BREATH: 0

## 2023-02-01 NOTE — PROGRESS NOTES
Patient: Елена Mccoy is a 62 y.o. female who presents today with the following Chief Complaint(s):  Chief Complaint   Patient presents with    New Patient    Arm Pain     Achey pain, x1 1/2 weeks       Assessment/Plan:    1. Benign paroxysmal positional vertigo, unspecified laterality  She has continued vertigo. She was seen for this previously and prescribed meclizine. She has a history of environmental/seasonal allergies and takes Zyrtec. She did not want to take this medication. Plan is Flonase OTC daily and referral to PT for vestibular therapy. - [facility]    2. Right arm pain  Pain and right upper arm appears to be musculoskeletal in origin. No abnormalities, masses or deformities identified on clinical exam.  Suspect a strain of her bicep. Plan is Tylenol or ibuprofen as needed and gentle stretches. 3. Hypercholesteremia  Reviewed labs completed on 1/12/2023. Cholesterol is elevated. Discussed treatment recommendations and ASCVD risk score. She is working on weight loss. Encouraged her to continue her nutrition goals and to increase her exercise. She can try red yeast rice. We also discussed benefits of a statin. Plan is for her to implement nutrition and exercise changes and follow-up in 6 months for recheck. 4. BMI 32.0-32.9,adult  Continue nutrition goals-recommend using a tracking luis. Decrease carbohydrates and sugars. She has been walking regularly but says that she walks at a very slow pace. Encouraged her to increase her walking pace and to add in body weight movements or light weights. Referred her to dietitian as well. 5. Primary open angle glaucoma of both eyes, unspecified glaucoma stage  Follow-up with [facility] as planned. 6. Encounter to establish care  Patient's past medical history, surgical history, family history, medications,  and allergies were all reviewed and updated as appropriate today.       Return in about 6 months (around 8/1/2023), or if symptoms worsen or fail to improve, for Cholesterol. HPI:     She is a 59-year-old female who is here to establish care. She has a history of open angle glaucoma, history of malaria, vertigo and seasonal/environmental allergies. Environmental allergies-managed well with Zyrtec. She says her main symptom is her skin feels itchy. When she takes Zyrtec this resolves. She denies any rash. She has vertigo-she was prescribed meclizine but did not want to take this medication. She has not been able to resolve her vertigo with Epley maneuvers on her own. Right arm pain for 1 week- hurts to touch. No pain with movement. She is working on weight loss. She has been using NOOM and walks daily. Arm Pain   The incident occurred 5 to 7 days ago. There was no injury mechanism. The pain is present in the upper right arm. The quality of the pain is described as aching. The pain does not radiate. The pain is mild. The pain has been Intermittent since the incident. Pertinent negatives include no chest pain, muscle weakness, numbness or tingling. The symptoms are aggravated by palpation. She has tried nothing for the symptoms. The 10-year ASCVD risk score (Tal DK, et al., 2019) is: 3.3%    Values used to calculate the score:      Age: 62 years      Sex: Female      Is Non- : Yes      Diabetic: No      Tobacco smoker: No      Systolic Blood Pressure: 849 mmHg      Is BP treated: No      HDL Cholesterol: 64 mg/dL      Total Cholesterol: 265 mg/dL      Current Outpatient Medications   Medication Sig Dispense Refill    TIMOLOL MALEATE OP Apply to eye      pilocarpine (PILOCAR) 1 % ophthalmic solution Apply 1 drop to eye      latanoprost (XALATAN) 0.005 % ophthalmic solution Apply 1 drop to eye      cetirizine (ZYRTEC) 10 MG tablet TAKE ONE TABLET BY MOUTH DAILY 30 tablet 0     No current facility-administered medications for this visit.        Review of Systems   Constitutional:  Negative for appetite change, chills, diaphoresis, fatigue and fever.   HENT: Negative.     Eyes: Negative.    Respiratory:  Negative for shortness of breath.    Cardiovascular:  Negative for chest pain.   Gastrointestinal: Negative.    Genitourinary:  Negative for difficulty urinating.   Musculoskeletal:  Positive for myalgias (right upper arm).   Skin: Negative.    Neurological:  Positive for dizziness (vertigo). Negative for tingling and numbness.   Hematological: Negative.    Psychiatric/Behavioral:  Negative for dysphoric mood and sleep disturbance. The patient is not nervous/anxious.      Vitals:    02/01/23 0917   BP: 116/84   Site: Left Upper Arm   Position: Sitting   Cuff Size: Large Adult   Pulse: 71   Resp: 16   SpO2: 99%   Weight: 183 lb (83 kg)   Height: 5' 3\" (1.6 m)     Physical Exam  Vitals reviewed.   Constitutional:       General: She is not in acute distress.     Appearance: Normal appearance. She is not ill-appearing or toxic-appearing.   HENT:      Head: Normocephalic and atraumatic.      Right Ear: Tympanic membrane, ear canal and external ear normal. There is no impacted cerumen.      Left Ear: Tympanic membrane, ear canal and external ear normal. There is no impacted cerumen.      Nose: Nose normal. No congestion or rhinorrhea.      Mouth/Throat:      Mouth: Mucous membranes are moist.      Pharynx: Oropharynx is clear. No oropharyngeal exudate or posterior oropharyngeal erythema.   Eyes:      General:         Right eye: No discharge.         Left eye: No discharge.      Extraocular Movements: Extraocular movements intact.      Conjunctiva/sclera: Conjunctivae normal.      Pupils: Pupils are equal, round, and reactive to light.   Cardiovascular:      Rate and Rhythm: Normal rate and regular rhythm.      Pulses: Normal pulses.      Heart sounds: Normal heart sounds. No murmur heard.    No friction rub. No gallop.   Pulmonary:      Effort: Pulmonary effort is  normal.      Breath sounds: Normal breath sounds. No stridor. No wheezing, rhonchi or rales. Musculoskeletal:         General: Tenderness present. No swelling, deformity or signs of injury. Normal range of motion. Right upper arm: Tenderness present. No swelling, edema, deformity, lacerations or bony tenderness. Arms:       Cervical back: Normal range of motion and neck supple. No tenderness. Right lower leg: No edema. Left lower leg: No edema. Lymphadenopathy:      Cervical: No cervical adenopathy. Skin:     General: Skin is warm and dry. Capillary Refill: Capillary refill takes less than 2 seconds. Coloration: Skin is not jaundiced or pale. Findings: No erythema or rash. Neurological:      General: No focal deficit present. Mental Status: She is alert and oriented to person, place, and time. Psychiatric:         Mood and Affect: Mood normal.         Behavior: Behavior normal.         Thought Content: Thought content normal.         Judgment: Judgment normal.       Patient Active Problem List   Diagnosis    Primary open angle glaucoma    History of malaria     Past Medical History:   Diagnosis Date    Environmental and seasonal allergies     Glaucoma     Vertigo       Past Surgical History:   Procedure Laterality Date    MOUTH SURGERY         Family History   Problem Relation Age of Onset    Hypertension Mother     Hypertension Father       Allergies   Allergen Reactions    Cat Hair Extract     Chloroquine Itching       This chart was generated using the [address] [place] [address] [address] Pager[employer]ation system. I created this record but it may contain dictation errors due to the limitation of the software.

## 2023-02-01 NOTE — Clinical Note
This is a patient who would benefit from connecting with the dietician. She has a history of elevated cholesterol.    Thank you,   Demetrius RENDON, CNP

## 2023-02-02 ENCOUNTER — CARE COORDINATION (OUTPATIENT)
Dept: CARE COORDINATION | Age: 58
End: 2023-02-02

## 2023-02-02 NOTE — CARE COORDINATION
cc outreach 1 pcp referral. Attempted to reach patient by phone. No answer. Left brief message with name, contact number and asked for return call back. Waiting for patient response at this time. Will update notes when callback is received. Will follow up at another date and time.

## 2023-02-03 ENCOUNTER — CARE COORDINATION (OUTPATIENT)
Dept: CARE COORDINATION | Age: 58
End: 2023-02-03

## 2023-02-03 NOTE — CARE COORDINATION
cc outreach 2 pcp referral. Attempted to reach patient by phone. No answer. Left brief message with name, contact number and asked for return call back. Waiting for patient response at this time. Will update notes when callback is received. Will follow up at another date and time.

## 2023-02-03 NOTE — CARE COORDINATION
Ambulatory Care Coordination Note  2/3/2023    ACC: Lori Brannon, NAMAN    Spoke to patient for initial cc screening from pcp referral. Patient reports no falls or injuries. Grab bars and tub mat not present in home. Well lit, reduced clutter. Patient does not use assistive devices for gait. Patient confirms she wants to speak to a dietician  for diet modifications to lower cholesterol and loose weight. No financial issues reported at this time. No additional questions, concerns. Starts PT 2/8/23 for initial evaluation. Plan:  POC to pcp/ Zoë ARANA primary ACM  FU RD initiated plan- how is it going  Sdoh, education, care gaps    Offered patient enrollment in the Remote Patient Monitoring (RPM) program for in-home monitoring: Patient is not eligible for RPM program.    Ambulatory Care Coordination Assessment    Care Coordination Protocol  Referral from Primary Care Provider: Yes  Week 1 - Initial Assessment     Do you have all of your prescriptions and are they filled?: Yes  Barriers to medication adherence: None  Are you able to afford your medications?: Yes  How often do you have trouble taking your medications the way you have been told to take them?: I always take them as prescribed. Do you have Home O2 Therapy?: No      Ability to seek help/take action for Emergent Urgent situations i.e. fire, crime, inclement weather or health crisis. : Independent  Ability to ambulate to restroom: Independent  Ability handle personal hygeine needs (bathing/dressing/grooming): Independent  Ability to manage Medications: Independent  Ability to prepare Food Preparation: Independent  Ability to maintain home (clean home, laundry): Independent  Ability to drive and/or has transportation: Independent  Ability to do shopping: Independent  Ability to manage finances:  Independent  Is patient able to live independently?: Yes     Current Housing: Private Residence        Per the [facility], did the patient have 2 or more falls or 1 fall with injury in the past year?: No     Frequent urination at night?: No  Do you use rails/bars?: No  Do you have a non-slip tub mat?: No     Are you experiencing loss of meaning?: No  Are you experiencing loss of hope and peace?: No     Thinking about your patient's physical health needs, are there any symptoms or problems (risk indicators) you are unsure about that require further investigation?: No identified areas of uncertainly or problems already being investigated   Are the patients physical health problems impacting on their mental well-being?: No identified areas of concern   Are there any problems with your patients lifestyle behaviors (alcohol, drugs, diet, exercise) that are impacting on physical or mental well-being?: No identified areas of concern   Do you have any other concerns about your patients mental well-being? How would you rate their severity and impact on the patient?: No identified areas of concern   How would you rate their home environment in terms of safety and stability (including domestic violence, insecure housing, neighbor harassment)?: Safety/stability questionable   How do daily activities impact on the patient's well-being? (include current or anticipated unemployment, work, caregiving, access to transportation or other): No identified problems or perceived positive benefits   How would you rate their social network (family, work, friends)?: Good participation with social networks   How would you rate their financial resources (including ability to afford all required medical care)?: Financially secure, resources adequate, no identified problems   How wells does the patient now understand their health and well-being (symptoms, signs or risk factors) and what they need to do to manage their health?: Little understanding which impacts on their ability to undertake better management   How well do you think your patient can engage in healthcare discussions?  (Barriers include language, deafness, aphasia, alcohol or drug problems, learning difficulties, concentration): Clear and open communication, no identified barriers   Do other services need to be involved to help this patient?: Other care/services not in place and required   Are current services involved with this patient well-coordinated? (Include coordination with other services you are now recommendation): Required care/services missing and/or fragmented   Suggested Interventions and Community Resources   Registered Dietician: In Process         Set up/Review Goals              Prior to Admission medications    Medication Sig Start Date End Date Taking?  Authorizing Provider   TIMOLOL MALEATE OP Apply to eye    Historical Provider, MD   pilocarpine (PILOCAR) 1 % ophthalmic solution Apply 1 drop to eye 5/7/21   Historical Provider, MD   latanoprost (XALATAN) 0.005 % ophthalmic solution Apply 1 drop to eye 7/2/21   Historical Provider, MD   cetirizine (ZYRTEC) 10 MG tablet TAKE ONE TABLET BY MOUTH DAILY 2/6/20   Faye Blue MD       Future Appointments   Date Time Provider Christofer Walker   2/8/2023  9:30 AM Seema Parks PT [facility] AND PT Brendan Gaviria   7/21/2023 10:00 AM MD [facility]  [place] - D[facility]      and   General Assessment    Do you have any symptoms that are causing concern?: No

## 2023-02-08 ENCOUNTER — HOSPITAL ENCOUNTER (OUTPATIENT)
Dept: PHYSICAL THERAPY | Age: 58
Setting detail: THERAPIES SERIES
Discharge: HOME | End: 2023-02-08

## 2023-02-08 ENCOUNTER — CARE COORDINATION (OUTPATIENT)
Dept: CARE COORDINATION | Age: 58
End: 2023-02-08

## 2023-02-08 NOTE — FLOWSHEET NOTE
[facility],  [address]        Physical Therapy  Cancellation/No-show Note  Patient Name:  Bryan Crane  :  1965   Date:  2023  Cancelled visits to date: 1  No-shows to date: 0    For today's appointment patient:  [x]  Cancelled  []  Rescheduled appointment  []  No-show     Reason given by patient:  []  Patient ill  []  Conflicting appointment  []  No transportation    []  Conflict with work  []  No reason given  [x]  Other:  pt reports she is feeling better    Comments:      Electronically signed by:  Priscilla Costa PT

## 2023-02-16 ENCOUNTER — CARE COORDINATION (OUTPATIENT)
Dept: CARE COORDINATION | Age: 58
End: 2023-02-16

## 2023-02-16 NOTE — CARE COORDINATION
Contacted [facility] and left voicemail regarding Dietitian referral. Left call back number and will follow up as appropriate.        Tylor Dean, [address] [place] [address], [place]  [phone]

## 2023-02-21 ENCOUNTER — CARE COORDINATION (OUTPATIENT)
Dept: CARE COORDINATION | Age: 58
End: 2023-02-21

## 2023-02-24 ENCOUNTER — CARE COORDINATION (OUTPATIENT)
Dept: CARE COORDINATION | Age: 58
End: 2023-02-24

## 2023-02-24 NOTE — CARE COORDINATION
Bella Shah  February 24, 2023    Initial Referral Reason: Desired Weight Loss, HLD    Patient Care Team:  JUSTICE Buck CNP as PCP - General (Family Medicine)  [facility],  as PCP - OBGYN (Obstetrics & Gynecology)  JUSTICE Buck CNP as PCP - Empaneled Provider  Agustín Medrano RN as Ambulatory Care Manager  Jatinder Silverio RD as Dietitian (Dietitian Registered)    Past Medical History:    Current Outpatient Medications   Medication Sig Dispense Refill    TIMOLOL MALEATE OP Apply to eye      pilocarpine (PILOCAR) 1 % ophthalmic solution Apply 1 drop to eye      latanoprost (XALATAN) 0.005 % ophthalmic solution Apply 1 drop to eye      cetirizine (ZYRTEC) 10 MG tablet TAKE ONE TABLET BY MOUTH DAILY 30 tablet 0     No current facility-administered medications for this visit.        Biochemical Data, Medical Tests and Procedures:    Lab Results   Component Value Date    LABA1C 5.6 01/12/2023     Lab Results   Component Value Date    .0 01/12/2023       Lab Results   Component Value Date    CHOL 215 (H) 08/19/2021    CHOL 202 (H) 05/12/2015     Lab Results   Component Value Date    TRIG 102 08/19/2021    TRIG 92 05/12/2015     Lab Results   Component Value Date    HDL 64 (H) 01/12/2023    HDL 63 (H) 08/19/2021    HDL 60 05/12/2015     Lab Results   Component Value Date    LDLCALC 176 (H) 01/12/2023    LDLCALC 132 (H) 08/19/2021    LDLCALC 124 (H) 05/12/2015     Lab Results   Component Value Date    LABVLDL 25 01/12/2023    LABVLDL 20 08/19/2021    LABVLDL 18 05/12/2015     No results found for: [facility]    Lab Results   Component Value Date    WBC 3.8 (L) 01/12/2023    HGB 14.1 01/12/2023    HCT 44.1 01/12/2023    MCV 85.0 01/12/2023     01/12/2023       Lab Results   Component Value Date    CREATININE 0.8 01/12/2023    BUN 14 01/12/2023     01/12/2023    K 4.4 01/12/2023     01/12/2023    CO2 26 01/12/2023         Anthropometric Measurements:    Height: 63\"  Weight: 183 lb (83 kg) (2/1/23)  BMI: 32.42 kg/m2 (obese, class I)  IBW: 115 lb +-10%  %IBW: 159.1 %    Physical Exam Findings:  Deferred    Nutrition Interview: RD called patient, explained reason for call and role in care. Patient states her main nutrition concern is desired weight loss and how to lower her cholesterol. Patient reports a current weight of 183 lbs, and has a goal weight of 150 lbs. Note patient's most recent cholesterol level was 265 mg/dL on 1/12/23. Patient reports that she eats three meals per day, and very seldom dines out. Patient is of [place] descent and prepares many traditional [place] foods. Per patient, starch is typically the \"main dish\" and meat is typically a side. Patient reports that she snacks \"all of the time. \" Patient drinks mostly calorie-free and sugar-free beverages. Patient's physical activity consists of walking four to five times per week for 30-60 minutes at a time. HLD: RD explained that a heart-healthy diet is recommended to reduce unhealthy blood cholesterol levels, manage high blood pressure, and lower overall risk for heart disease. Explained the importance of eating a balanced diet that includes whole grains, fruits and vegetables, and lean protein sources. Discussed choosing heart-healthy unsaturated fats and limiting saturated fats, trans fats, and cholesterol intake. Explained that saturated fat is usually found in animal-based protein (i.e., butter, whole milk, brasher, sausage, poultry skin) and is the biggest contributor to elevated LDL cholesterol levels in the diet. Explained that trans fats can be found in stick margarine, shortening, processed sweets, baked goods, some fried foods, and packaged foods made with hydrogenated oils. Encouraged patient to limit the amount of cholesterol intake to 200 mg per day. Explained that foods high in cholesterol include fatty meat, whole milk, cheese, shrimp, lobster, and crab.  Encouraged patient to eat more omega-3 fats (heart-healthy fats) such as salmon, tuna, walnuts, flaxseed, and canola/soybean oils. Encouraged patient to consume more plant-based or vegetarian meals using beans and soy foods for protein. Discussed the benefits of eating whole, unprocessed foods to limit sodium intake. Encouraged patient to limit refined carbohydrates especially sugar, sweets and sugar-sweetened beverages. Explained that patient should also try to achieve and maintain a healthy weight help lower risk for heart disease. Desired Weight Loss: RD explained the importance of setting healthy, realistic goals. Discussed you are more likely to succeed when you set realistic goals for yourself- make small changes step by step and you will see a big impact with time. Explained the importance of eating at least 3 meals/day and making these meals balanced with a variety of food. Discussed the components of a balanced meal using the MyPlate LLFDLDBHG-9/7 plate fruits and/or vegetables, 1/4 plate protein and 1/4 plate starchy carbohydrates with 8 oz glass of low fat milk if desired. Provided an example of a balanced meal: grilled chicken with broccoli and a serving of brown rice. Discussed watching portion sizes to help manage calorie intake. RD encouraged patient to focus on eating 3 balanced meals a day instead of 2 meals/day. Explained the importance of meeting estimated nutrition needs. Explained if we are not eating enough throughout the day, our body will go into starvation mode and hold onto fat cells. RD reiterated importance of choosing fruits, vegetables, whole grains, lean protein sources and low fat dairy products. RD discussed the importance of incorporating physical activity. Explained the recommended amount is 150 minutes/week. RD acknowledged patient's readiness to change and encouraged pt to focus on making small changes one at a time.      RD offered to mail educational handouts to patient to reinforce concepts discussed during phone conversation, patient was agreeable. RD verified patient's home address. 24-Hour Diet Recall  Breakfast  Consumed: Oatmeal with yogurt or milk OR peanut butter/jelly sandwich OR Eran Eran (boiled bean pudding)    Lunch  Consumed: [place] dish made with vegetables (cabbage, spinach, broccoli, green beans, etc) and grits     Dinner  Consumed: Rice and vegetables     Snacks  Consumed: Bananas, baby carrots, mixed nuts     Beverages: Water, black tea (occasionally prepared with sugar or cream), black coffee    Frequency of meals away from home: Very seldom, approximately once every two months or so    Exercise: Walk 4-5x/week for 30-60 minutes at a time     Blood sugar checks: N/A    Nutrition Diagnosis:  #1 Problem Overweight/Obesity       Etiology related to large portions, excess caloric intake       Signs/Symptoms as evidenced by patient report, food recall, BMI 32.42 kg/m2     #2 Problem Hypercholesterolemia        Etiology related to poor food choices       Signs/Symptoms as evidenced by patient report, cholesterol 265 mg/dL     Nutrition Intervention:     Estimated Needs  Heart Healthy Diet  providing 1500 kcals to promote 1 lb weight loss/week ([place] based on CBW).     Estimated daily CHO Needs: 160-235 g (based on 45-65% total calorie intake)  Estimated daily Protein Needs: 70-85 g (based on 0.8-1.0 g/kg based on CBW)  Estimated daily Fluid Needs: 1700 mL fluid/d (based on 20 mL/kg based on CBW)    #1 Nutrition Information: Provided patient with Adding Fiber to Lower Cholesterol, How Can I Improve Cholesterol, Cholesterol Lowering Nutrition Therapy, 1500 Calorie Meal Plan, All Foods Can Fit, Balanced Plate, Changing My Eating Habits, Eat Frequently to Lose Weight, Grocery Shopping Tips, Handling Weight Plateaus, Healthy Snacking Tips, Label Reading, Making Healthy Choices with a Healthy Plate, Portion Control, Smart Snacking handouts for reinforcement of concepts discussed during nutrition interview. #2 Nutrition Counseling: Used open-ended questions to assess patients perceived susceptibility and severity of disease state. Discussed potential impact of health threat on patient's lifestyle. Used open-ended questions to assess patient's perception regarding benefits of and barriers to implementation of nutrition therapy. #3 Nutrition Education: Clearly defined the benefits of nutrition therapy. Summarized and affirmed positive aspects of current nutrition patterns. Provided education regarding value of adherence to  heart healthy diet . Discussed ways to establish applying concepts of alternatives and choices regarding implementation of diet. Explored ideas for small, incremental goals to initiate behavior change. Monitoring and Evaluation:    Indicator/Goal Criteria   #1 Limit trans fats, saturated fats, and cholesterol #1 I will limit butter, whole milk, brasher, sausage, margarine, processed sweets, etc   #2 Limit portion sizes  #2 I will adhere to appropriate portion sizes    #3 Follow MyPlate guidelines  #3 I will build balanced meal using the MyPlate reference: 1/2 plate fruits and/or vegetables, 1/4 plate protein, and 1/4 plate starchy carbohydrates with 8 oz glass of low fat milk if desired     Follow Up: RD will call patient in three to four weeks to follow up and make sure pt received handouts in mail. RD will answer any nutrition related questions at this time.      Makayla Beatty, [address] [place] [address],    [phone]

## 2023-03-08 ENCOUNTER — CARE COORDINATION (OUTPATIENT)
Dept: CARE COORDINATION | Age: 58
End: 2023-03-08

## 2023-03-15 ENCOUNTER — CARE COORDINATION (OUTPATIENT)
Dept: CARE COORDINATION | Age: 58
End: 2023-03-15

## 2023-03-15 NOTE — CARE COORDINATION
ACM attempted outreach to patient to touch base and complete assessments. Patient has been unable to reach x3 attempts. Final outreach attempt was made and patient was provided ACM contact information to reach out if she has ongoing care coordination needs. RD will be notified that ACM plans to D/C patient from care coordination and to update when RD has completed outreach to close episode. No further follow up at this time.

## 2023-03-24 ENCOUNTER — CARE COORDINATION (OUTPATIENT)
Dept: CARE COORDINATION | Age: 58
End: 2023-03-24

## 2023-03-24 NOTE — CARE COORDINATION
Contacted [facility] and left voicemail regarding Dietitian follow up. Left call back number and will follow up as appropriate.        Suyapa Rosario, [address] [place] [address], [place]  [phone]

## 2023-04-07 ENCOUNTER — CARE COORDINATION (OUTPATIENT)
Dept: CARE COORDINATION | Age: 58
End: 2023-04-07

## 2023-04-07 NOTE — CARE COORDINATION
Contacted [facility] and left voicemail regarding Dietitian follow up. RD has attempted to call patient two times. Left call back number. RD will continue to follow/assist with patient return call.        Mohsen Glez, [address] [place] [address],    [phone]

## 2023-05-25 ENCOUNTER — COMMUNITY OUTREACH (OUTPATIENT)
Dept: FAMILY MEDICINE CLINIC | Age: 58
End: 2023-05-25

## 2024-09-08 ASSESSMENT — PATIENT HEALTH QUESTIONNAIRE - PHQ9
2. FEELING DOWN, DEPRESSED OR HOPELESS: NOT AT ALL
SUM OF ALL RESPONSES TO PHQ QUESTIONS 1-9: 0
SUM OF ALL RESPONSES TO PHQ9 QUESTIONS 1 & 2: 0
SUM OF ALL RESPONSES TO PHQ QUESTIONS 1-9: 0
SUM OF ALL RESPONSES TO PHQ QUESTIONS 1-9: 0
2. FEELING DOWN, DEPRESSED OR HOPELESS: NOT AT ALL
1. LITTLE INTEREST OR PLEASURE IN DOING THINGS: NOT AT ALL
SUM OF ALL RESPONSES TO PHQ9 QUESTIONS 1 & 2: 0
1. LITTLE INTEREST OR PLEASURE IN DOING THINGS: NOT AT ALL
SUM OF ALL RESPONSES TO PHQ QUESTIONS 1-9: 0

## 2024-09-11 ENCOUNTER — OFFICE VISIT (OUTPATIENT)
Dept: FAMILY MEDICINE CLINIC | Age: 59
End: 2024-09-11
Payer: COMMERCIAL

## 2024-09-11 VITALS
HEART RATE: 72 BPM | SYSTOLIC BLOOD PRESSURE: 106 MMHG | DIASTOLIC BLOOD PRESSURE: 76 MMHG | BODY MASS INDEX: 33.49 KG/M2 | OXYGEN SATURATION: 100 % | WEIGHT: 189 LBS | HEIGHT: 63 IN

## 2024-09-11 DIAGNOSIS — E78.00 HYPERCHOLESTEREMIA: ICD-10-CM

## 2024-09-11 DIAGNOSIS — Z13.1 SCREENING FOR DIABETES MELLITUS: ICD-10-CM

## 2024-09-11 DIAGNOSIS — R53.83 TIREDNESS: ICD-10-CM

## 2024-09-11 DIAGNOSIS — E04.9 ENLARGED THYROID: ICD-10-CM

## 2024-09-11 DIAGNOSIS — H81.10 BENIGN PAROXYSMAL POSITIONAL VERTIGO, UNSPECIFIED LATERALITY: ICD-10-CM

## 2024-09-11 DIAGNOSIS — Z13.21 ENCOUNTER FOR VITAMIN DEFICIENCY SCREENING: ICD-10-CM

## 2024-09-11 DIAGNOSIS — Z00.01 ENCOUNTER FOR WELL ADULT EXAM WITH ABNORMAL FINDINGS: Primary | ICD-10-CM

## 2024-09-11 LAB
25(OH)D3 SERPL-MCNC: 35.4 NG/ML
ALBUMIN SERPL-MCNC: 4.6 G/DL (ref 3.4–5)
ALBUMIN/GLOB SERPL: 1.6 {RATIO} (ref 1.1–2.2)
ALP SERPL-CCNC: 68 U/L (ref 40–129)
ALT SERPL-CCNC: 14 U/L (ref 10–40)
ANION GAP SERPL CALCULATED.3IONS-SCNC: 11 MMOL/L (ref 3–16)
AST SERPL-CCNC: 16 U/L (ref 15–37)
BASOPHILS # BLD: 0 K/UL (ref 0–0.2)
BASOPHILS NFR BLD: 0.7 %
BILIRUB SERPL-MCNC: <0.2 MG/DL (ref 0–1)
BUN SERPL-MCNC: 15 MG/DL (ref 7–20)
CALCIUM SERPL-MCNC: 9.7 MG/DL (ref 8.3–10.6)
CHLORIDE SERPL-SCNC: 106 MMOL/L (ref 99–110)
CHOLEST SERPL-MCNC: 232 MG/DL (ref 0–199)
CO2 SERPL-SCNC: 27 MMOL/L (ref 21–32)
CREAT SERPL-MCNC: 0.8 MG/DL (ref 0.6–1.1)
DEPRECATED RDW RBC AUTO: 14.6 % (ref 12.4–15.4)
EOSINOPHIL # BLD: 0.1 K/UL (ref 0–0.6)
EOSINOPHIL NFR BLD: 1.6 %
GFR SERPLBLD CREATININE-BSD FMLA CKD-EPI: 85 ML/MIN/{1.73_M2}
GLUCOSE SERPL-MCNC: 80 MG/DL (ref 70–99)
HCT VFR BLD AUTO: 43 % (ref 36–48)
HDLC SERPL-MCNC: 56 MG/DL (ref 40–60)
HGB BLD-MCNC: 14.3 G/DL (ref 12–16)
LDLC SERPL CALC-MCNC: 148 MG/DL
LYMPHOCYTES # BLD: 1.9 K/UL (ref 1–5.1)
LYMPHOCYTES NFR BLD: 48.4 %
MCH RBC QN AUTO: 28.1 PG (ref 26–34)
MCHC RBC AUTO-ENTMCNC: 33.3 G/DL (ref 31–36)
MCV RBC AUTO: 84.4 FL (ref 80–100)
MONOCYTES # BLD: 0.3 K/UL (ref 0–1.3)
MONOCYTES NFR BLD: 8.4 %
NEUTROPHILS # BLD: 1.6 K/UL (ref 1.7–7.7)
NEUTROPHILS NFR BLD: 40.9 %
PLATELET # BLD AUTO: 204 K/UL (ref 135–450)
PMV BLD AUTO: 7.9 FL (ref 5–10.5)
POTASSIUM SERPL-SCNC: 4.6 MMOL/L (ref 3.5–5.1)
PROT SERPL-MCNC: 7.4 G/DL (ref 6.4–8.2)
RBC # BLD AUTO: 5.09 M/UL (ref 4–5.2)
SODIUM SERPL-SCNC: 144 MMOL/L (ref 136–145)
TRIGL SERPL-MCNC: 140 MG/DL (ref 0–150)
TSH SERPL DL<=0.005 MIU/L-ACNC: 2.4 UIU/ML (ref 0.27–4.2)
VLDLC SERPL CALC-MCNC: 28 MG/DL
WBC # BLD AUTO: 4 K/UL (ref 4–11)

## 2024-09-11 PROCEDURE — 99214 OFFICE O/P EST MOD 30 MIN: CPT | Performed by: REGISTERED NURSE

## 2024-09-11 PROCEDURE — 99396 PREV VISIT EST AGE 40-64: CPT | Performed by: REGISTERED NURSE

## 2024-09-11 RX ORDER — CYCLOSPORINE 0.5 MG/ML
EMULSION OPHTHALMIC
COMMUNITY
Start: 2023-10-30

## 2024-09-11 SDOH — ECONOMIC STABILITY: FOOD INSECURITY: WITHIN THE PAST 12 MONTHS, THE FOOD YOU BOUGHT JUST DIDN'T LAST AND YOU DIDN'T HAVE MONEY TO GET MORE.: NEVER TRUE

## 2024-09-11 SDOH — ECONOMIC STABILITY: INCOME INSECURITY: HOW HARD IS IT FOR YOU TO PAY FOR THE VERY BASICS LIKE FOOD, HOUSING, MEDICAL CARE, AND HEATING?: NOT VERY HARD

## 2024-09-11 SDOH — ECONOMIC STABILITY: FOOD INSECURITY: WITHIN THE PAST 12 MONTHS, YOU WORRIED THAT YOUR FOOD WOULD RUN OUT BEFORE YOU GOT MONEY TO BUY MORE.: NEVER TRUE

## 2024-09-11 ASSESSMENT — ENCOUNTER SYMPTOMS
GASTROINTESTINAL NEGATIVE: 1
EYES NEGATIVE: 1
SHORTNESS OF BREATH: 0

## 2024-09-12 LAB
EST. AVERAGE GLUCOSE BLD GHB EST-MCNC: 116.9 MG/DL
HBA1C MFR BLD: 5.7 %

## 2024-09-17 ENCOUNTER — HOSPITAL ENCOUNTER (OUTPATIENT)
Dept: ULTRASOUND IMAGING | Age: 59
Discharge: HOME | End: 2024-09-17
Payer: COMMERCIAL

## 2024-09-17 PROCEDURE — 76536 US EXAM OF HEAD AND NECK: CPT

## 2024-10-11 ENCOUNTER — OFFICE VISIT (OUTPATIENT)
Dept: FAMILY MEDICINE CLINIC | Age: 59
End: 2024-10-11
Payer: COMMERCIAL

## 2024-10-11 VITALS
OXYGEN SATURATION: 98 % | HEART RATE: 76 BPM | SYSTOLIC BLOOD PRESSURE: 118 MMHG | DIASTOLIC BLOOD PRESSURE: 78 MMHG | BODY MASS INDEX: 33.66 KG/M2 | WEIGHT: 190 LBS

## 2024-10-11 DIAGNOSIS — E04.1 CYST OF THYROID DETERMINED BY ULTRASOUND: ICD-10-CM

## 2024-10-11 DIAGNOSIS — G47.9 SLEEP DIFFICULTIES: Primary | ICD-10-CM

## 2024-10-11 DIAGNOSIS — Z78.9 TAKES DIETARY SUPPLEMENTS: ICD-10-CM

## 2024-10-11 DIAGNOSIS — Z71.84 COUNSELING FOR TRAVEL: ICD-10-CM

## 2024-10-11 PROCEDURE — 99214 OFFICE O/P EST MOD 30 MIN: CPT | Performed by: REGISTERED NURSE

## 2024-10-11 RX ORDER — ACETAMINOPHEN 160 MG
2000 TABLET,DISINTEGRATING ORAL DAILY
COMMUNITY

## 2024-10-11 RX ORDER — M-VIT,TX,IRON,MINS/CALC/FOLIC 27MG-0.4MG
1 TABLET ORAL DAILY
COMMUNITY

## 2024-10-11 ASSESSMENT — ENCOUNTER SYMPTOMS
RESPIRATORY NEGATIVE: 1
GASTROINTESTINAL NEGATIVE: 1

## 2024-10-11 NOTE — PROGRESS NOTES
Patient: Mary Grace Boone is a 58 y.o. female who presents today with the following Chief Complaint(s):  Chief Complaint   Patient presents with    Follow-up       Assessment/Plan:    1. Sleep difficulties  Difficulty sleeping.  Recommend progressive muscle relaxation breathing exercises nightly.  Due to her report of snoring discussed benefits of a sleep study.  She would like to proceed with this.  Referral placed.  - Theresa Mcmanus, Sleep Medicine, [facility]    2. BMI 33.0-33.9,adult  -Exercise 30 minutes 5 times weekly.  Continue aqua aerobics and Renetta class.  -Continue using [employer]itness[employer].  Follow low carbohydrate healthy nutrition plan.    3. Cyst of thyroid determined by ultrasound  Reviewed results of thyroid ultrasound.  A small cyst was noted on her ultrasound-no lymphadenopathy.  TSH was within normal limits.    4. Takes dietary supplements  No contraindications for current supplements.     5. Counseling for travel  She has had a yellow fever vaccination in the past.  This is over 10 years old.  Per CDC recommendations a booster is appropriate if vaccine was given more than 10 years ago.  Recommend follow-up at [facility] or [facility] for yellow fever vaccine.      Return in about 2 months (around 12/11/2024), or if symptoms worsen or fail to improve.    HPI:     She is here for follow up.     Sleep-she does not sleep well.  Is difficult for her to go to sleep at night.  She typically will finally fall asleep around 4 or 5 in the morning.  She does not have a lot of energy early in the day due to this.  She snores but does not know if she experiences any sleep apnea.    Supplements-she is taking a Centrum for women vitamin, magnesium, red yeast rice and vitamin D.    Yellow card-she had her yellow fever vaccination-it has been over 10 years.  She is going to be traveling home to [facility] in November and wants to have this updated.    Nutrition and Exercise- started using [employer], she

## 2024-10-11 NOTE — PATIENT INSTRUCTIONS
Yellow Fever vaccine- [employer] or [employer]    Exercise 30 minutes 5 times weekly.     Continue using [employer].

## 2024-10-17 ENCOUNTER — PATIENT MESSAGE (OUTPATIENT)
Dept: FAMILY MEDICINE CLINIC | Age: 59
End: 2024-10-17

## 2024-11-01 ENCOUNTER — TELEPHONE (OUTPATIENT)
Dept: PULMONOLOGY | Age: 59
End: 2024-11-01

## 2025-06-18 ENCOUNTER — OFFICE VISIT (OUTPATIENT)
Dept: ORTHOPEDIC SURGERY | Age: 60
End: 2025-06-18
Payer: COMMERCIAL

## 2025-06-18 ENCOUNTER — OFFICE VISIT (OUTPATIENT)
Dept: FAMILY MEDICINE CLINIC | Age: 60
End: 2025-06-18

## 2025-06-18 VITALS
SYSTOLIC BLOOD PRESSURE: 122 MMHG | HEIGHT: 63 IN | WEIGHT: 196 LBS | DIASTOLIC BLOOD PRESSURE: 82 MMHG | HEART RATE: 85 BPM | BODY MASS INDEX: 34.73 KG/M2 | OXYGEN SATURATION: 98 %

## 2025-06-18 VITALS — BODY MASS INDEX: 34.73 KG/M2 | WEIGHT: 196 LBS | HEIGHT: 63 IN

## 2025-06-18 DIAGNOSIS — M22.2X2 PATELLOFEMORAL ARTHRALGIA OF BOTH KNEES: ICD-10-CM

## 2025-06-18 DIAGNOSIS — M22.2X1 PATELLOFEMORAL ARTHRALGIA OF BOTH KNEES: ICD-10-CM

## 2025-06-18 DIAGNOSIS — M25.561 RIGHT KNEE PAIN, UNSPECIFIED CHRONICITY: Primary | ICD-10-CM

## 2025-06-18 DIAGNOSIS — M24.9 HYPERMOBILE JOINT SYNDROME OF KNEE: ICD-10-CM

## 2025-06-18 DIAGNOSIS — K59.09 OTHER CONSTIPATION: ICD-10-CM

## 2025-06-18 DIAGNOSIS — M25.562 CHRONIC PAIN OF BOTH KNEES: Primary | ICD-10-CM

## 2025-06-18 DIAGNOSIS — M25.561 CHRONIC PAIN OF BOTH KNEES: Primary | ICD-10-CM

## 2025-06-18 DIAGNOSIS — G89.29 CHRONIC PAIN OF BOTH KNEES: Primary | ICD-10-CM

## 2025-06-18 DIAGNOSIS — M25.512 ACUTE PAIN OF LEFT SHOULDER: ICD-10-CM

## 2025-06-18 PROCEDURE — 99203 OFFICE O/P NEW LOW 30 MIN: CPT

## 2025-06-18 RX ORDER — MELOXICAM 15 MG/1
15 TABLET ORAL DAILY PRN
Qty: 14 TABLET | Refills: 0 | Status: SHIPPED | OUTPATIENT
Start: 2025-06-18

## 2025-06-18 SDOH — ECONOMIC STABILITY: FOOD INSECURITY: WITHIN THE PAST 12 MONTHS, THE FOOD YOU BOUGHT JUST DIDN'T LAST AND YOU DIDN'T HAVE MONEY TO GET MORE.: NEVER TRUE

## 2025-06-18 SDOH — ECONOMIC STABILITY: FOOD INSECURITY: WITHIN THE PAST 12 MONTHS, YOU WORRIED THAT YOUR FOOD WOULD RUN OUT BEFORE YOU GOT MONEY TO BUY MORE.: NEVER TRUE

## 2025-06-18 ASSESSMENT — ENCOUNTER SYMPTOMS
DIARRHEA: 0
VOMITING: 0
RESPIRATORY NEGATIVE: 1
CONSTIPATION: 1
NAUSEA: 0
ABDOMINAL PAIN: 0
BLOOD IN STOOL: 0
SHORTNESS OF BREATH: 0
BACK PAIN: 0

## 2025-06-18 ASSESSMENT — PATIENT HEALTH QUESTIONNAIRE - PHQ9
SUM OF ALL RESPONSES TO PHQ QUESTIONS 1-9: 0
2. FEELING DOWN, DEPRESSED OR HOPELESS: NOT AT ALL
1. LITTLE INTEREST OR PLEASURE IN DOING THINGS: NOT AT ALL
SUM OF ALL RESPONSES TO PHQ QUESTIONS 1-9: 0

## 2025-06-18 NOTE — PROGRESS NOTES
Patient: Mary Grace Boone    MRN: [id number]  YOB: 1965          Age: 59 y.o.            Sex: female    Subjective     Chief Complaint:  Knee Pain (Ck right knee)      History of Present Illness:  Mary Grace Boone is a 59 y.o. female who presents with knee pain. Bilateral, R > L. Ongoing for the last year. Reports achey pain mostly at the anterior aspect of the bilateral knees. Worse with stairs and sit to stand. No specific IVAN. No new extracurricular activities. States that she began going to the gym thinking that her knee pain was r/t her weight and she attempted to starting going to the gym but this has not helped. Hasn't necessarily worsened either.   No previous injury or surgery to either knee.       The pain assessment was noted & is as follows:  Pain Assessment  Location of Pain: Knee  Location Modifiers: Right  Severity of Pain: 3  Quality of Pain: Aching  Duration of Pain: A few minutes  Frequency of Pain: Several times daily  Aggravating Factors: Stairs  Limiting Behavior: Some  Relieving Factors: Rest  Result of Injury: No  Work-Related Injury: No  Are there other pain locations you wish to document?: No      Medical History  Current Medications:   Current Outpatient Medications   Medication Sig Dispense Refill    meloxicam (MOBIC) 15 MG tablet Take 1 tablet by mouth daily as needed for Pain 14 tablet 0    Multiple Vitamins-Minerals (THERAPEUTIC MULTIVITAMIN-MINERALS) tablet Take 1 tablet by mouth daily      Red Yeast Rice Extract (RED YEAST RICE PO) Take 1 capsule by mouth daily      Magnesium 400 MG CAPS Take 1 capsule by mouth daily      vitamin D (VITAMIN D3) 50 MCG (2000 UT) CAPS capsule Take 1 capsule by mouth daily      cycloSPORINE (RESTASIS) 0.05 % ophthalmic emulsion Apply to eye      pilocarpine (PILOCAR) 1 % ophthalmic solution Apply 1 drop to eye      latanoprost (XALATAN) 0.005 % ophthalmic solution Apply 1 drop to eye      cetirizine (ZYRTEC) 10 MG tablet TAKE ONE TABLET BY

## 2025-06-18 NOTE — PATIENT INSTRUCTIONS
Bowel management- probiotic and fiber daily. Take Miralax daily until bowel movements are large and regular.     Return in 2 weeks

## 2025-06-18 NOTE — PROGRESS NOTES
generated using the Dragon dictation system.  I created this record but it may contain dictation errors due to the limitation of the software.    The patient (or guardian, if applicable) and other individuals in attendance with the patient were advised that Artificial Intelligence will be utilized during this visit to record, process the conversation to generate a clinical note, and support improvement of the AI technology. The patient (or guardian, if applicable) and other individuals in attendance at the appointment consented to the use of AI, including the recording.

## 2025-06-24 NOTE — PLAN OF CARE
provided via "[employer]  10 minutes                                                            Manual Intervention (93105)  TIME                                        NMR re-education (50184) resistance Sets/time Reps CUES NEEDED                                      Therapeutic Activity (59798)  Sets/time                                          Modalities:    No modalities applied this session    Education/Home Exercise Program: Patient HEP program created electronically.  Refer to "[employer] access code:  [email or web address]  Access Code: [email or web address]  URL: [email or web address]  Date: 06/25/2025  Prepared by: Dalton Lopez    Exercises  - Sidelying Hip Abduction  - 1 x daily - 5 x weekly - 2 sets - 10 reps  - Active Straight Leg Raise with Quad Set  - 1 x daily - 5 x weekly - 2 sets - 10 reps  ASSESSMENT   Assessment:   Mary Grace Boone is a 59 y.o. female presenting today to Outpatient PT with signs and symptoms consistent with B patellofemoral pain.    Pt. presents with the functional impairments and activity limitations listed below and would benefit from Outpatient PT to address the below impairments as well as improve pain, and restore function.     Functional Impairments:   Noted lumbar/proximal hip/LE joint hypomobility  Decreased core/proximal hip strength and neuromuscular control  Decreased LE functional strength   Decreased LE endurance  Gait instability/impairment    Functional Activity Limitations (from functional questionnaire and intake):  Reduced ability to tolerate prolonged functional positions  Reduced ability or difficulty with changes of positions or transfers between positions  Reduced ability or tolerance with driving and/or computer work  Reduced ability to ascend/descend stairs    Participation Restrictions:   Reduced participation in home management   Reduced participation in work activities  Reduced participation in social activities    Classification :   Signs/symptoms consistent with patella-femoral

## 2025-06-25 ENCOUNTER — HOSPITAL ENCOUNTER (OUTPATIENT)
Dept: PHYSICAL THERAPY | Age: 60
Setting detail: THERAPIES SERIES
Discharge: HOME | End: 2025-06-25
Payer: COMMERCIAL

## 2025-06-25 DIAGNOSIS — R29.898 BILATERAL LEG WEAKNESS: ICD-10-CM

## 2025-06-25 DIAGNOSIS — M25.561 BILATERAL CHRONIC KNEE PAIN: Primary | ICD-10-CM

## 2025-06-25 DIAGNOSIS — G89.29 BILATERAL CHRONIC KNEE PAIN: Primary | ICD-10-CM

## 2025-06-25 DIAGNOSIS — M25.562 BILATERAL CHRONIC KNEE PAIN: Primary | ICD-10-CM

## 2025-06-25 PROCEDURE — 97161 PT EVAL LOW COMPLEX 20 MIN: CPT

## 2025-06-25 PROCEDURE — 97110 THERAPEUTIC EXERCISES: CPT

## 2025-07-02 ENCOUNTER — APPOINTMENT (OUTPATIENT)
Dept: PHYSICAL THERAPY | Age: 60
End: 2025-07-02
Payer: COMMERCIAL

## 2025-07-03 ENCOUNTER — OFFICE VISIT (OUTPATIENT)
Dept: ORTHOPEDIC SURGERY | Age: 60
End: 2025-07-03
Payer: COMMERCIAL

## 2025-07-03 ENCOUNTER — OFFICE VISIT (OUTPATIENT)
Dept: FAMILY MEDICINE CLINIC | Age: 60
End: 2025-07-03

## 2025-07-03 VITALS
BODY MASS INDEX: 34.02 KG/M2 | SYSTOLIC BLOOD PRESSURE: 130 MMHG | HEART RATE: 68 BPM | OXYGEN SATURATION: 99 % | WEIGHT: 192 LBS | HEIGHT: 63 IN | DIASTOLIC BLOOD PRESSURE: 70 MMHG

## 2025-07-03 VITALS — WEIGHT: 196 LBS | BODY MASS INDEX: 34.73 KG/M2 | HEIGHT: 63 IN

## 2025-07-03 DIAGNOSIS — M22.2X2 PATELLOFEMORAL ARTHRALGIA OF BOTH KNEES: ICD-10-CM

## 2025-07-03 DIAGNOSIS — M25.512 ACUTE PAIN OF LEFT SHOULDER: ICD-10-CM

## 2025-07-03 DIAGNOSIS — R14.0 ABDOMINAL DISTENSION (GASEOUS): ICD-10-CM

## 2025-07-03 DIAGNOSIS — M25.812 IMPINGEMENT OF LEFT SHOULDER: ICD-10-CM

## 2025-07-03 DIAGNOSIS — M25.561 CHRONIC PAIN OF BOTH KNEES: ICD-10-CM

## 2025-07-03 DIAGNOSIS — M22.2X1 PATELLOFEMORAL ARTHRALGIA OF BOTH KNEES: ICD-10-CM

## 2025-07-03 DIAGNOSIS — M25.562 CHRONIC PAIN OF BOTH KNEES: ICD-10-CM

## 2025-07-03 DIAGNOSIS — R52 PAIN: Primary | ICD-10-CM

## 2025-07-03 DIAGNOSIS — M75.22 BICEPS TENDINITIS OF LEFT SHOULDER: ICD-10-CM

## 2025-07-03 DIAGNOSIS — G89.29 CHRONIC PAIN OF BOTH KNEES: ICD-10-CM

## 2025-07-03 DIAGNOSIS — K59.09 OTHER CONSTIPATION: Primary | ICD-10-CM

## 2025-07-03 DIAGNOSIS — R10.33 PERIUMBILICAL ABDOMINAL PAIN: ICD-10-CM

## 2025-07-03 PROCEDURE — 99203 OFFICE O/P NEW LOW 30 MIN: CPT | Performed by: ORTHOPAEDIC SURGERY

## 2025-07-03 ASSESSMENT — ENCOUNTER SYMPTOMS
VOMITING: 0
CONSTIPATION: 1
NAUSEA: 0
RESPIRATORY NEGATIVE: 1
BLOOD IN STOOL: 0
SHORTNESS OF BREATH: 0
DIARRHEA: 0
ABDOMINAL DISTENTION: 1
EYES NEGATIVE: 1
ABDOMINAL PAIN: 1

## 2025-07-03 NOTE — PROGRESS NOTES
Dr Oniel Urbano      Date /Time 7/3/2025       10:33 AM EDT  Name Mary Grace Boone             1965   Location  [facility]  MRN [id number]                Chief Complaint   Patient presents with    New Patient     RT. KNEE, LT. SHOULDER        History of Present Illness  Mary Grace Boone is a 59 y.o. female who presents with  right knee pain, left shoulder pain.    Sent in consultation by Nissa Key, APRN - CNP, .    Occupational activities: clerical work.  Athletic/exercise activity: no sports.  Injury Mechanism:  none.  Worker's Comp. & legal issues:   none.  Previous Treatments: Ice, Heat, NSAIDs, and pain medication    She reports a several week history of indolent onset of right knee pain and left shoulder pain.  Pain is intermittent but has improved somewhat as of late.    For the shoulder, she has pain with overhead activity and reaching back behind her.  For the knee, she reports anterior based knee pain with crouching activities and going up and down stairs.    Past History  Past Medical History:   Diagnosis Date    Environmental and seasonal allergies     Glaucoma     Vertigo      Past Surgical History:   Procedure Laterality Date    MOUTH SURGERY       Social History     Tobacco Use    Smoking status: Never    Smokeless tobacco: Never   Substance Use Topics    Alcohol use: Yes     Alcohol/week: 1.0 standard drink of alcohol     Types: 1 Glasses of wine per week     Comment: occassional      Current Outpatient Medications on File Prior to Visit   Medication Sig Dispense Refill    meloxicam (MOBIC) 15 MG tablet Take 1 tablet by mouth daily as needed for Pain 14 tablet 0    Multiple Vitamins-Minerals (THERAPEUTIC MULTIVITAMIN-MINERALS) tablet Take 1 tablet by mouth daily      Red Yeast Rice Extract (RED YEAST RICE PO) Take 1 capsule by mouth daily      Magnesium 400 MG CAPS Take 1 capsule by mouth daily      vitamin D (VITAMIN D3) 50 MCG (2000 UT) CAPS capsule Take 1 capsule by mouth

## 2025-07-03 NOTE — PATIENT INSTRUCTIONS
Eat 30 different types of plant foods in a week- vegetables, fruits, whole grains, nuts, seeds, herbs, spices.     Continue fiber daily.     Dr. Asael Salinas

## 2025-07-03 NOTE — PROGRESS NOTES
Patient: Mary Grace Boone is a 59 y.o. female who presents today with the following Chief Complaint(s):  Chief Complaint   Patient presents with    2 Week Follow-Up       Assessment/Plan:    Assessment & Plan  1. Constipation.  - Improvement noted with psyllium husk, Metamucil, probiotic, and MiraLAX.  - Physical exam findings indicate no current tenderness or discomfort.  - Discussed the importance of continuing fiber supplements and incorporating a variety of plant foods to enhance gut microbiome and digestion.  - Recommended consultation with a gastroenterologist for further evaluation and management.     2. Abdominal pain.  - Pain persists despite improvement in constipation, often occurring at night and improving with the release of gas.  - No tenderness or discomfort noted during the physical exam.  - Discussed potential causes including gas buildup and constipation, and the importance of monitoring stool and gut health.  - Referral to Dr. Holden, a gastroenterologist, for further evaluation and management.       1. Other constipation  - Maurice Maya MD, Gastroenterology (ERCP & EUS), [facility]    2. Periumbilical abdominal pain  - Maurice Maya MD, Gastroenterology (ERCP & EUS), [facility]    3. Abdominal distension (gaseous)  - Maurice Maya MD, Gastroenterology (ERCP & EUS), [facility]    4. Chronic pain of both knees  Attend physical therapy.  Follow-up with Ortho as planned.    5. Acute pain of left shoulder  Attend physical therapy.  Follow-up with Ortho as planned.    Return if symptoms worsen or fail to improve.    HPI:     History of Present Illness  The patient presents for evaluation of constipation and abdominal pain.    She reports no current pain or discomfort. She has been managing her symptoms with psyllium husk, Metamucil, Mylanta, probiotics, and MiraLAX, which have improved her bowel movements. She is inquiring about the duration of these treatments. She

## 2025-07-09 ENCOUNTER — HOSPITAL ENCOUNTER (OUTPATIENT)
Dept: PHYSICAL THERAPY | Age: 60
Setting detail: THERAPIES SERIES
Discharge: HOME | End: 2025-07-09
Payer: COMMERCIAL

## 2025-07-09 PROCEDURE — 97110 THERAPEUTIC EXERCISES: CPT

## 2025-07-09 NOTE — FLOWSHEET NOTE
[facility] - Outpatient Rehabilitation and Therapy: [address], [place] [address] office: [phone] fax: [phone]     Physical Therapy Treatment Note    Physical Therapy: TREATMENT/PROGRESS NOTE   Patient: Mary Grace Boone (59 y.o. female)   Examination Date: 2025   :  1965 MRN: [id number]   Visit #: 2   Insurance Allowable Auth Needed   $35 copay, hard max 50 visits/year,  []Yes    [x]No    Insurance: [facility] / Plan: [employer] [facility] / Product Type: *No Product type* /   Insurance ID: [id number] - ([employer])  Secondary Insurance (if applicable):    Treatment Diagnosis:     ICD-10-CM    1. Bilateral chronic knee pain  M25.561     M25.562     G89.29       2. 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]   Referring Physician: Slime Muller PA  PCP: Nissa Key APRN - CNP     Plan of care signed (Y/N): Y    Date of Patient follow up with Physician: N/A   Plan of Care Report: NO  POC update due: (10 visits /OR AUTH LIMITS, whichever is less) 2025                                             Medical History:  Comorbidities:  None  Past Medical History:   Diagnosis Date    Environmental and seasonal allergies     Glaucoma     Vertigo      Relevant Medical History: Occasional LBP                          Precautions/ Contra-indications:           Latex allergy:  NO  Pacemaker:    NO  Contraindications for Manipulation: None  Date of Surgery: N/A  Other: Current L GHJ pain     Red Flags:  None    Suicide Screening:   The patient did not verbalize a primary behavioral concern, suicidal ideation, suicidal intent, or demonstrate suicidal behaviors.    Preferred Language for Healthcare:   [x] English       [] other:    SUBJECTIVE EXAMINATION   Patient stated complaint: Patient arrived roughly 10 minutes late to

## 2025-07-22 ENCOUNTER — HOSPITAL ENCOUNTER (OUTPATIENT)
Dept: PHYSICAL THERAPY | Age: 60
Setting detail: THERAPIES SERIES
Discharge: HOME | End: 2025-07-22
Payer: COMMERCIAL

## 2025-07-22 PROCEDURE — 97110 THERAPEUTIC EXERCISES: CPT

## 2025-07-22 NOTE — FLOWSHEET NOTE
[facility] - Outpatient Rehabilitation and Therapy: [address], [place] [address] office: [phone] fax: [phone]     Physical Therapy Treatment Note    Physical Therapy: TREATMENT/PROGRESS NOTE   Patient: Mary Grace Boone (59 y.o. female)   Examination Date: 2025   :  1965 MRN: [id number]   Visit #: 3   Insurance Allowable Auth Needed   $35 copay, hard max 50 visits/year,  []Yes    [x]No    Insurance: [facility] / Plan: [employer] [facility] / Product Type: *No Product type* /   Insurance ID: [id number] - ([employer])  Secondary Insurance (if applicable):    Treatment Diagnosis:     ICD-10-CM    1. Bilateral chronic knee pain  M25.561     M25.562     G89.29       2. 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]   Referring Physician: Slime Muller PA  PCP: Nissa Key APRN - CNP     Plan of care signed (Y/N): Y    Date of Patient follow up with Physician: N/A   Plan of Care Report: NO  POC update due: (10 visits /OR AUTH LIMITS, whichever is less) 2025                                             Medical History:  Comorbidities:  None  Past Medical History:   Diagnosis Date    Environmental and seasonal allergies     Glaucoma     Vertigo      Relevant Medical History: Occasional LBP                          Precautions/ Contra-indications:           Latex allergy:  NO  Pacemaker:    NO  Contraindications for Manipulation: None  Date of Surgery: N/A  Other: Current L GHJ pain     Red Flags:  None    Suicide Screening:   The patient did not verbalize a primary behavioral concern, suicidal ideation, suicidal intent, or demonstrate suicidal behaviors.    Preferred Language for Healthcare:   [x] English       [] other:    SUBJECTIVE EXAMINATION   Patient stated complaint: Patient reports her knees have been feeling

## 2025-07-23 ENCOUNTER — APPOINTMENT (OUTPATIENT)
Dept: PHYSICAL THERAPY | Age: 60
End: 2025-07-23
Payer: COMMERCIAL

## 2025-07-30 ENCOUNTER — HOSPITAL ENCOUNTER (OUTPATIENT)
Dept: PHYSICAL THERAPY | Age: 60
Setting detail: THERAPIES SERIES
End: 2025-07-30
Payer: COMMERCIAL